# Patient Record
Sex: MALE | Race: WHITE | Employment: FULL TIME | URBAN - METROPOLITAN AREA
[De-identification: names, ages, dates, MRNs, and addresses within clinical notes are randomized per-mention and may not be internally consistent; named-entity substitution may affect disease eponyms.]

---

## 2017-07-24 ENCOUNTER — HOSPITAL ENCOUNTER (EMERGENCY)
Age: 47
Discharge: HOME | End: 2017-07-24
Attending: EMERGENCY MEDICINE
Payer: COMMERCIAL

## 2017-07-24 ENCOUNTER — APPOINTMENT (OUTPATIENT)
Dept: GENERAL RADIOLOGY | Age: 47
End: 2017-07-24
Attending: EMERGENCY MEDICINE
Payer: COMMERCIAL

## 2017-07-24 VITALS
TEMPERATURE: 97.5 F | BODY MASS INDEX: 20.52 KG/M2 | RESPIRATION RATE: 16 BRPM | HEART RATE: 113 BPM | WEIGHT: 143.3 LBS | OXYGEN SATURATION: 97 % | HEIGHT: 70 IN | DIASTOLIC BLOOD PRESSURE: 78 MMHG | SYSTOLIC BLOOD PRESSURE: 130 MMHG

## 2017-07-24 DIAGNOSIS — S22.41XA CLOSED FRACTURE OF MULTIPLE RIBS OF RIGHT SIDE, INITIAL ENCOUNTER: Primary | ICD-10-CM

## 2017-07-24 PROCEDURE — 99282 EMERGENCY DEPT VISIT SF MDM: CPT

## 2017-07-24 PROCEDURE — 74011250637 HC RX REV CODE- 250/637: Performed by: EMERGENCY MEDICINE

## 2017-07-24 PROCEDURE — 71101 X-RAY EXAM UNILAT RIBS/CHEST: CPT

## 2017-07-24 RX ORDER — HYDROCODONE BITARTRATE AND ACETAMINOPHEN 5; 325 MG/1; MG/1
1 TABLET ORAL
Status: COMPLETED | OUTPATIENT
Start: 2017-07-24 | End: 2017-07-24

## 2017-07-24 RX ORDER — HYDROCODONE BITARTRATE AND ACETAMINOPHEN 5; 325 MG/1; MG/1
1 TABLET ORAL
Qty: 20 TAB | Refills: 0 | Status: SHIPPED | OUTPATIENT
Start: 2017-07-24 | End: 2019-01-30

## 2017-07-24 RX ORDER — CLARITHROMYCIN 500 MG/1
500 TABLET, FILM COATED, EXTENDED RELEASE ORAL DAILY
COMMUNITY
End: 2019-01-30

## 2017-07-24 RX ORDER — HYDROCHLOROTHIAZIDE 25 MG/1
25 TABLET ORAL DAILY
COMMUNITY
End: 2019-01-30

## 2017-07-24 RX ADMIN — HYDROCODONE BITARTRATE AND ACETAMINOPHEN 1 TABLET: 5; 325 TABLET ORAL at 10:57

## 2017-07-24 NOTE — DISCHARGE INSTRUCTIONS
We hope that we have addressed all of your medical concerns. The examination and treatment you received in the Emergency Department were for an emergent problem and were not intended as complete care. It is important that you follow up with your healthcare provider(s) for ongoing care. If your symptoms worsen or do not improve as expected, and you are unable to reach your usual health care provider(s), you should return to the Emergency Department. Today's healthcare is undergoing tremendous change, and patient satisfaction surveys are one of the many tools to assess the quality of medical care. You may receive a survey from the [employer] regarding your experience in the Emergency Department. I hope that your experience has been completely positive, particularly the medical care that I provided. As such, please participate in the survey; anything less than excellent does not meet my expectations or intentions. Formerly [facility] and [address] [place] [address] participate in nationally recognized quality of care measures. If your blood pressure is greater than 120/80, as reported below, we urge that you seek medical care to address the potential of high blood pressure, commonly known as hypertension. Hypertension can be hereditary or can be caused by certain medical conditions, pain, stress, or \"white coat syndrome. \"       Please make an appointment with your health care provider(s) for follow up of your Emergency Department visit. VITALS:   Patient Vitals for the past 8 hrs:   Temp Pulse Resp BP SpO2   07/24/17 1005 97.5 °F (36.4 °C) (!) 135 18 179/89 94 %          Thank you for allowing us to provide you with medical care today. We realize that you have many choices for your emergency care needs. Please choose us in the future for any continued health care needs.       Robert Pate MD    [facility], [address] [facility]. Office: [phone]            No results found for this or any previous visit (from the past 24 hour(s)). Xr Ribs Rt W Pa Cxr Min 3 V    Result Date: 7/24/2017  EXAM:  XR RIBS RT W PA CXR MIN 3 V INDICATION:   fall, hit right ribs, eval for fracture COMPARISON: None. FINDINGS: A frontal radiograph of the chest and 3 oblique views of the right ribs demonstrate nondisplaced fractures of anterior right sixth and seventh ribs. . There is no pneumothorax or pleural effusion. IMPRESSION:  Nondisplaced fractures right sixth and seventh ribs            Broken Rib: Care Instructions  Your Care Instructions    A broken rib is a crack or break in one of the bones of the rib cage. Breathing can be very painful because the muscles used for breathing pull on the rib. In most cases, a broken rib will heal on its own. You can take pain medicine while the rib mends. Pain relief allows you to take deep breaths. In the past, doctors recommended taping or wrapping broken ribs. This is no longer done because taping makes it hard for you to take deep breaths. You need to take deep breaths at least once an hour to prevent pneumonia or a partial collapse of a lung. Your rib will heal in about 6 weeks. You heal best when you take good care of yourself. Eat a variety of healthy foods, and don't smoke. Follow-up care is a key part of your treatment and safety. Be sure to make and go to all appointments, and call your doctor if you are having problems. It's also a good idea to know your test results and keep a list of the medicines you take. How can you care for yourself at home? · Be safe with medicines. Read and follow all instructions on the label. ¨ If the doctor gave you a prescription medicine for pain, take it as prescribed. ¨ If you are not taking a prescription pain medicine, ask your doctor if you can take an over-the-counter medicine.   · Even if it hurts, cough or take the deepest breath you can at least once every hour. This will get air deeply into your lungs and reduce your chance of getting pneumonia or a partial collapse of a lung. Hold a pillow against your chest to make this less painful. · Put ice or a cold pack on the area for 10 to 20 minutes at a time. Put a thin cloth between the ice and your skin. When should you call for help? Call 911 anytime you think you may need emergency care. For example, call if:  · You have severe trouble breathing. Call your doctor now or seek immediate medical care if:  · You have some trouble breathing. · You have a fever. · You have a new or worse cough. Watch closely for changes in your health, and be sure to contact your doctor if:  · You have pain even after taking your medicine. · You do not get better as expected. Where can you learn more? Go to http://yohannes-tye.info/. Enter M135 in the search box to learn more about \"Broken Rib: Care Instructions. \"  Current as of: March 21, 2017  Content Version: 11.3  © 3228-4213 [employer]. Care instructions adapted under license by [employer] (which disclaims liability or warranty for this information). If you have questions about a medical condition or this instruction, always ask your healthcare professional. [address] any warranty or liability for your use of this information.

## 2017-07-24 NOTE — ED TRIAGE NOTES
Pt ambulates to treatment area he states that on Saturday he fell into the corner of the coffee table and injured his right lower ribs. He is having constant pain but it increases with certain movements and when he coughs.   He recently has had bronchitis and concerned about the ribs

## 2017-07-24 NOTE — LETTER
[facility] EMERGENCY DEPT 
[address] [facility] [address] [id number] [phone] Work/School Note Date: 7/24/2017 To Whom It May concern: 
 
Villa Go was seen and treated today in the emergency room by the following provider(s): 
Attending Provider: Wade Ramsey MD. Villa Go may return to work on 07/25/17. Sincerely, Sammie Pena RN

## 2017-07-24 NOTE — ED PROVIDER NOTES
HPI Comments: 68-year-old white male presents to the emergency department with right rib pain. Patient fell 2 days ago after he tripped over the dog. He hit his right lateral ribs on the corner of the coffee table. Patient has had pain in the right lateral ribs. He has taken ibuprofen with some relief. He also has used a heating pad on the right ribs. He denies any shortness of breath. No abdominal pain. No vomiting. He has been eating and drinking normally over the past 2 days. He describes moderate pain in the right lateral ribs that is worse with palpation. No radiation of the pain. The history is provided by the patient and the spouse. Past Medical History:   Diagnosis Date    Bronchitis     Hypertension        History reviewed. No pertinent surgical history. History reviewed. No pertinent family history. Social History     Social History    Marital status: SINGLE     Spouse name: N/A    Number of children: N/A    Years of education: N/A     Occupational History    Not on file. Social History Main Topics    Smoking status: Current Every Day Smoker     Packs/day: 2.00    Smokeless tobacco: Never Used    Alcohol use 25.2 oz/week     42 Cans of beer per week      Comment: daily    Drug use: No    Sexual activity: Not on file     Other Topics Concern    Not on file     Social History Narrative         ALLERGIES: Review of patient's allergies indicates no known allergies. Review of Systems   Constitutional: Negative for activity change and fever. HENT: Negative for congestion. Eyes: Negative for pain. Respiratory: Negative for cough and shortness of breath. Cardiovascular: Negative for leg swelling. Gastrointestinal: Negative for abdominal pain. Endocrine: Negative for polyuria. Genitourinary: Negative for flank pain and hematuria. Musculoskeletal: Negative for gait problem, neck pain and neck stiffness. Skin: Negative for color change. Allergic/Immunologic: Negative for immunocompromised state. Neurological: Negative for speech difficulty and headaches. Hematological: Does not bruise/bleed easily. Psychiatric/Behavioral: Negative for confusion. All other systems reviewed and are negative. Vitals:    07/24/17 1005   BP: 179/89   Pulse: (!) 135   Resp: 18   Temp: 97.5 °F (36.4 °C)   SpO2: 94%   Weight: 65 kg (143 lb 4.8 oz)   Height: 5' 10\" (1.778 m)            Physical Exam   Constitutional: He is oriented to person, place, and time. He appears well-developed and well-nourished. No distress. HENT:   Head: Normocephalic and atraumatic. Right Ear: External ear normal.   Left Ear: External ear normal.   Eyes: EOM are normal. Pupils are equal, round, and reactive to light. Neck: Normal range of motion. Neck supple. No JVD present. No tracheal deviation present. Cardiovascular: Regular rhythm and normal heart sounds. Exam reveals no gallop and no friction rub. No murmur heard. Hr is 120 and regular   Pulmonary/Chest: Effort normal and breath sounds normal. No stridor. No respiratory distress. He has no wheezes. He has no rales. He exhibits tenderness. Tender over right lateral ribs to palpation, no crepitus   Abdominal: Soft. Bowel sounds are normal. He exhibits no distension. There is no tenderness. There is no rebound and no guarding. Musculoskeletal: Normal range of motion. He exhibits no edema or tenderness. Neurological: He is alert and oriented to person, place, and time. He has normal reflexes. No cranial nerve deficit. He exhibits normal muscle tone. Coordination normal.   Skin: Skin is warm and dry. No rash noted. He is not diaphoretic. No erythema. Psychiatric: He has a normal mood and affect. His behavior is normal. Judgment and thought content normal.   Anxious appearing   Nursing note and vitals reviewed.        MDM  Number of Diagnoses or Management Options  Diagnosis management comments: Patient has pain in the right lateral ribs to palpation. Differential diagnosis includes fracture, contusion. We'll check x-rays. We'll give hydrocodone and apply ice. Patient's heart rate is elevated I think is some due to pain and some due to anxiety. Amount and/or Complexity of Data Reviewed  Tests in the radiology section of CPT®: ordered and reviewed  Decide to obtain previous medical records or to obtain history from someone other than the patient: yes  Obtain history from someone other than the patient: yes  Review and summarize past medical records: yes  Independent visualization of images, tracings, or specimens: yes    Risk of Complications, Morbidity, and/or Mortality  Presenting problems: moderate  Diagnostic procedures: moderate  Management options: moderate      ED Course       Procedures    Xrays show fractures nondisplaced over two ribs on right    Apply ice to affected area 20 min on and 20 min off    Norco prn for pain    Good return precautions given to patient. Close follow up with PCP recommended. Patient and/or family voices understanding of this plan. Discharge instructions were explained by me and all concerns were addressed.

## 2017-07-24 NOTE — ED NOTES
Pt given discharge instructions by Dr Luis Aguiar, he verbalizes an understanding pt stable at time of discharge

## 2019-01-30 ENCOUNTER — APPOINTMENT (OUTPATIENT)
Dept: CT IMAGING | Age: 49
DRG: 065 | End: 2019-01-30
Attending: EMERGENCY MEDICINE
Payer: COMMERCIAL

## 2019-01-30 ENCOUNTER — APPOINTMENT (OUTPATIENT)
Dept: MRI IMAGING | Age: 49
DRG: 065 | End: 2019-01-30
Attending: INTERNAL MEDICINE
Payer: COMMERCIAL

## 2019-01-30 ENCOUNTER — APPOINTMENT (OUTPATIENT)
Dept: NON INVASIVE DIAGNOSTICS | Age: 49
DRG: 065 | End: 2019-01-30
Attending: INTERNAL MEDICINE
Payer: COMMERCIAL

## 2019-01-30 ENCOUNTER — APPOINTMENT (OUTPATIENT)
Dept: VASCULAR SURGERY | Age: 49
DRG: 065 | End: 2019-01-30
Attending: INTERNAL MEDICINE
Payer: COMMERCIAL

## 2019-01-30 ENCOUNTER — HOSPITAL ENCOUNTER (INPATIENT)
Age: 49
LOS: 1 days | Discharge: HOME | DRG: 065 | End: 2019-01-31
Attending: EMERGENCY MEDICINE | Admitting: INTERNAL MEDICINE
Payer: COMMERCIAL

## 2019-01-30 DIAGNOSIS — I10 HYPERTENSION, UNSPECIFIED TYPE: ICD-10-CM

## 2019-01-30 DIAGNOSIS — F10.10 ALCOHOL ABUSE: ICD-10-CM

## 2019-01-30 DIAGNOSIS — I63.9 CEREBROVASCULAR ACCIDENT (CVA), UNSPECIFIED MECHANISM (HCC): Primary | ICD-10-CM

## 2019-01-30 PROBLEM — Z72.0 TOBACCO ABUSE: Status: ACTIVE | Noted: 2019-01-30

## 2019-01-30 LAB
ALBUMIN SERPL-MCNC: 3.5 G/DL (ref 3.5–5)
ALBUMIN/GLOB SERPL: 0.8 {RATIO} (ref 1.1–2.2)
ALP SERPL-CCNC: 144 U/L (ref 45–117)
ALT SERPL-CCNC: 19 U/L (ref 12–78)
ANION GAP SERPL CALC-SCNC: 12 MMOL/L (ref 5–15)
AST SERPL-CCNC: 20 U/L (ref 15–37)
BASOPHILS # BLD: 0.1 K/UL (ref 0–0.1)
BASOPHILS NFR BLD: 1 % (ref 0–1)
BILIRUB SERPL-MCNC: 0.6 MG/DL (ref 0.2–1)
BUN SERPL-MCNC: 16 MG/DL (ref 6–20)
BUN/CREAT SERPL: 16 (ref 12–20)
CALCIUM SERPL-MCNC: 9.5 MG/DL (ref 8.5–10.1)
CHLORIDE SERPL-SCNC: 102 MMOL/L (ref 97–108)
CO2 SERPL-SCNC: 25 MMOL/L (ref 21–32)
COMMENT, HOLDF: NORMAL
CREAT SERPL-MCNC: 1 MG/DL (ref 0.7–1.3)
DIFFERENTIAL METHOD BLD: ABNORMAL
ECHO AO ROOT DIAM: 3.91 CM
ECHO AV AREA PEAK VELOCITY: 1.9 CM2
ECHO AV PEAK GRADIENT: 8.4 MMHG
ECHO AV PEAK VELOCITY: 145.24 CM/S
ECHO EST RA PRESSURE: 3 MMHG
ECHO LA MAJOR AXIS: 2.42 CM
ECHO LA TO AORTIC ROOT RATIO: 0.62
ECHO LA VOL 2C: 27.39 ML (ref 18–58)
ECHO LA VOL 4C: 20.7 ML (ref 18–58)
ECHO LA VOL BP: 26.69 ML (ref 18–58)
ECHO LA VOL/BSA BIPLANE: 15.26 ML/M2 (ref 16–28)
ECHO LA VOLUME INDEX A2C: 15.66 ML/M2 (ref 16–28)
ECHO LA VOLUME INDEX A4C: 11.83 ML/M2 (ref 16–28)
ECHO LV INTERNAL DIMENSION DIASTOLIC: 4.24 CM (ref 4.2–5.9)
ECHO LV INTERNAL DIMENSION SYSTOLIC: 2.71 CM
ECHO LV IVSD: 0.84 CM (ref 0.6–1)
ECHO LV MASS 2D: 133.3 G (ref 88–224)
ECHO LV MASS INDEX 2D: 76.2 G/M2 (ref 49–115)
ECHO LV POSTERIOR WALL DIASTOLIC: 0.94 CM (ref 0.6–1)
ECHO LVOT DIAM: 2.07 CM
ECHO LVOT PEAK GRADIENT: 2.6 MMHG
ECHO LVOT PEAK VELOCITY: 81.23 CM/S
ECHO MV A VELOCITY: 56.54 CM/S
ECHO MV E DECELERATION TIME (DT): 183.4 MS
ECHO MV E VELOCITY: 0.47 CM/S
ECHO MV E/A RATIO: 0.01
ECHO MV REGURGITANT PEAK GRADIENT: 23.4 MMHG
ECHO MV REGURGITANT PEAK VELOCITY: 241.63 CM/S
ECHO PULMONARY ARTERY SYSTOLIC PRESSURE (PASP): 25.3 MMHG
ECHO PV MAX VELOCITY: 57.45 CM/S
ECHO PV PEAK GRADIENT: 1.3 MMHG
ECHO RIGHT VENTRICULAR SYSTOLIC PRESSURE (RVSP): 25.3 MMHG
ECHO RV INTERNAL DIMENSION: 2.17 CM
ECHO TV REGURGITANT MAX VELOCITY: 235.85 CM/S
ECHO TV REGURGITANT PEAK GRADIENT: 22.3 MMHG
EOSINOPHIL # BLD: 0.3 K/UL (ref 0–0.4)
EOSINOPHIL NFR BLD: 2 % (ref 0–7)
ERYTHROCYTE [DISTWIDTH] IN BLOOD BY AUTOMATED COUNT: 13.2 % (ref 11.5–14.5)
ETHANOL SERPL-MCNC: <10 MG/DL
GLOBULIN SER CALC-MCNC: 4.2 G/DL (ref 2–4)
GLUCOSE BLD STRIP.AUTO-MCNC: 127 MG/DL (ref 65–100)
GLUCOSE SERPL-MCNC: 106 MG/DL (ref 65–100)
HCT VFR BLD AUTO: 46.5 % (ref 36.6–50.3)
HGB BLD-MCNC: 15.6 G/DL (ref 12.1–17)
IMM GRANULOCYTES # BLD AUTO: 0.1 K/UL (ref 0–0.04)
IMM GRANULOCYTES NFR BLD AUTO: 0 % (ref 0–0.5)
INR BLD: 1 (ref 0.9–1.2)
LYMPHOCYTES # BLD: 1.8 K/UL (ref 0.8–3.5)
LYMPHOCYTES NFR BLD: 13 % (ref 12–49)
MCH RBC QN AUTO: 32.4 PG (ref 26–34)
MCHC RBC AUTO-ENTMCNC: 33.5 G/DL (ref 30–36.5)
MCV RBC AUTO: 96.7 FL (ref 80–99)
MONOCYTES # BLD: 1.1 K/UL (ref 0–1)
MONOCYTES NFR BLD: 8 % (ref 5–13)
NEUTS SEG # BLD: 10.7 K/UL (ref 1.8–8)
NEUTS SEG NFR BLD: 76 % (ref 32–75)
NRBC # BLD: 0 K/UL (ref 0–0.01)
NRBC BLD-RTO: 0 PER 100 WBC
PLATELET # BLD AUTO: 380 K/UL (ref 150–400)
PMV BLD AUTO: 9 FL (ref 8.9–12.9)
POTASSIUM SERPL-SCNC: 4 MMOL/L (ref 3.5–5.1)
PROT SERPL-MCNC: 7.7 G/DL (ref 6.4–8.2)
RBC # BLD AUTO: 4.81 M/UL (ref 4.1–5.7)
SAMPLES BEING HELD,HOLD: NORMAL
SERVICE CMNT-IMP: ABNORMAL
SODIUM SERPL-SCNC: 139 MMOL/L (ref 136–145)
WBC # BLD AUTO: 14 K/UL (ref 4.1–11.1)

## 2019-01-30 PROCEDURE — 85025 COMPLETE CBC W/AUTO DIFF WBC: CPT

## 2019-01-30 PROCEDURE — 36415 COLL VENOUS BLD VENIPUNCTURE: CPT

## 2019-01-30 PROCEDURE — 99285 EMERGENCY DEPT VISIT HI MDM: CPT

## 2019-01-30 PROCEDURE — 92523 SPEECH SOUND LANG COMPREHEN: CPT

## 2019-01-30 PROCEDURE — 93306 TTE W/DOPPLER COMPLETE: CPT

## 2019-01-30 PROCEDURE — 96374 THER/PROPH/DIAG INJ IV PUSH: CPT

## 2019-01-30 PROCEDURE — 74011250636 HC RX REV CODE- 250/636: Performed by: EMERGENCY MEDICINE

## 2019-01-30 PROCEDURE — 70496 CT ANGIOGRAPHY HEAD: CPT

## 2019-01-30 PROCEDURE — 80053 COMPREHEN METABOLIC PANEL: CPT

## 2019-01-30 PROCEDURE — 74011250637 HC RX REV CODE- 250/637: Performed by: EMERGENCY MEDICINE

## 2019-01-30 PROCEDURE — 93005 ELECTROCARDIOGRAM TRACING: CPT

## 2019-01-30 PROCEDURE — 80307 DRUG TEST PRSMV CHEM ANLYZR: CPT

## 2019-01-30 PROCEDURE — 93880 EXTRACRANIAL BILAT STUDY: CPT

## 2019-01-30 PROCEDURE — 82962 GLUCOSE BLOOD TEST: CPT

## 2019-01-30 PROCEDURE — 96361 HYDRATE IV INFUSION ADD-ON: CPT

## 2019-01-30 PROCEDURE — 70450 CT HEAD/BRAIN W/O DYE: CPT

## 2019-01-30 PROCEDURE — 74011636320 HC RX REV CODE- 636/320: Performed by: RADIOLOGY

## 2019-01-30 PROCEDURE — 65660000000 HC RM CCU STEPDOWN

## 2019-01-30 PROCEDURE — 70551 MRI BRAIN STEM W/O DYE: CPT

## 2019-01-30 PROCEDURE — 70544 MR ANGIOGRAPHY HEAD W/O DYE: CPT

## 2019-01-30 PROCEDURE — 85610 PROTHROMBIN TIME: CPT

## 2019-01-30 PROCEDURE — 74011250637 HC RX REV CODE- 250/637: Performed by: NURSE PRACTITIONER

## 2019-01-30 RX ORDER — FOLIC ACID 1 MG/1
1 TABLET ORAL
Status: COMPLETED | OUTPATIENT
Start: 2019-01-30 | End: 2019-01-30

## 2019-01-30 RX ORDER — ALBUTEROL SULFATE 90 UG/1
2 AEROSOL, METERED RESPIRATORY (INHALATION)
COMMUNITY

## 2019-01-30 RX ORDER — FOLIC ACID 1 MG/1
1 TABLET ORAL DAILY
Status: DISCONTINUED | OUTPATIENT
Start: 2019-01-31 | End: 2019-01-31 | Stop reason: HOSPADM

## 2019-01-30 RX ORDER — IBUPROFEN 200 MG
1 TABLET ORAL DAILY
Status: DISCONTINUED | OUTPATIENT
Start: 2019-01-30 | End: 2019-01-31 | Stop reason: HOSPADM

## 2019-01-30 RX ORDER — ASPIRIN 325 MG/1
100 TABLET, FILM COATED ORAL
Status: COMPLETED | OUTPATIENT
Start: 2019-01-30 | End: 2019-01-30

## 2019-01-30 RX ORDER — MULTIVITAMIN WITH IRON
1 TABLET ORAL
Status: COMPLETED | OUTPATIENT
Start: 2019-01-30 | End: 2019-01-30

## 2019-01-30 RX ORDER — ACETAMINOPHEN 650 MG/1
650 SUPPOSITORY RECTAL
Status: DISCONTINUED | OUTPATIENT
Start: 2019-01-30 | End: 2019-01-31 | Stop reason: HOSPADM

## 2019-01-30 RX ORDER — LORAZEPAM 2 MG/ML
2 INJECTION INTRAMUSCULAR
Status: DISCONTINUED | OUTPATIENT
Start: 2019-01-30 | End: 2019-01-31 | Stop reason: HOSPADM

## 2019-01-30 RX ORDER — IBUPROFEN 200 MG
200-400 TABLET ORAL
COMMUNITY
End: 2019-01-31

## 2019-01-30 RX ORDER — ONDANSETRON 2 MG/ML
4 INJECTION INTRAMUSCULAR; INTRAVENOUS
Status: COMPLETED | OUTPATIENT
Start: 2019-01-30 | End: 2019-01-30

## 2019-01-30 RX ORDER — ASPIRIN 325 MG
325 TABLET ORAL DAILY
Status: DISCONTINUED | OUTPATIENT
Start: 2019-01-31 | End: 2019-01-30

## 2019-01-30 RX ORDER — ENOXAPARIN SODIUM 100 MG/ML
40 INJECTION SUBCUTANEOUS DAILY
Status: DISCONTINUED | OUTPATIENT
Start: 2019-01-31 | End: 2019-01-31 | Stop reason: HOSPADM

## 2019-01-30 RX ORDER — SODIUM CHLORIDE 0.9 % (FLUSH) 0.9 %
5-40 SYRINGE (ML) INJECTION AS NEEDED
Status: DISCONTINUED | OUTPATIENT
Start: 2019-01-30 | End: 2019-01-31 | Stop reason: HOSPADM

## 2019-01-30 RX ORDER — LORAZEPAM 2 MG/ML
4 INJECTION INTRAMUSCULAR
Status: DISCONTINUED | OUTPATIENT
Start: 2019-01-30 | End: 2019-01-31 | Stop reason: HOSPADM

## 2019-01-30 RX ORDER — LISINOPRIL 20 MG/1
20 TABLET ORAL DAILY
COMMUNITY
End: 2019-01-31

## 2019-01-30 RX ORDER — GUAIFENESIN 100 MG/5ML
324 LIQUID (ML) ORAL
Status: COMPLETED | OUTPATIENT
Start: 2019-01-30 | End: 2019-01-30

## 2019-01-30 RX ORDER — LORAZEPAM 2 MG/ML
2 INJECTION INTRAMUSCULAR
Status: ACTIVE | OUTPATIENT
Start: 2019-01-30 | End: 2019-01-30

## 2019-01-30 RX ORDER — ACETAMINOPHEN 325 MG/1
650 TABLET ORAL
Status: DISCONTINUED | OUTPATIENT
Start: 2019-01-30 | End: 2019-01-31 | Stop reason: HOSPADM

## 2019-01-30 RX ORDER — ASPIRIN 325 MG/1
100 TABLET, FILM COATED ORAL DAILY
Status: DISCONTINUED | OUTPATIENT
Start: 2019-01-31 | End: 2019-01-31 | Stop reason: HOSPADM

## 2019-01-30 RX ORDER — GUAIFENESIN 100 MG/5ML
81 LIQUID (ML) ORAL DAILY
Status: DISCONTINUED | OUTPATIENT
Start: 2019-01-31 | End: 2019-01-30

## 2019-01-30 RX ORDER — GUAIFENESIN 100 MG/5ML
81 LIQUID (ML) ORAL DAILY
Status: DISCONTINUED | OUTPATIENT
Start: 2019-01-31 | End: 2019-01-31 | Stop reason: HOSPADM

## 2019-01-30 RX ORDER — IBUPROFEN 200 MG
1 TABLET ORAL DAILY
Status: DISCONTINUED | OUTPATIENT
Start: 2019-01-31 | End: 2019-01-30

## 2019-01-30 RX ORDER — SODIUM CHLORIDE 0.9 % (FLUSH) 0.9 %
5-40 SYRINGE (ML) INJECTION EVERY 8 HOURS
Status: DISCONTINUED | OUTPATIENT
Start: 2019-01-30 | End: 2019-01-31 | Stop reason: HOSPADM

## 2019-01-30 RX ADMIN — SODIUM CHLORIDE 1000 ML: 900 INJECTION, SOLUTION INTRAVENOUS at 10:54

## 2019-01-30 RX ADMIN — Medication 10 ML: at 21:01

## 2019-01-30 RX ADMIN — IOPAMIDOL 100 ML: 755 INJECTION, SOLUTION INTRAVENOUS at 10:42

## 2019-01-30 RX ADMIN — Medication 100 MG: at 12:04

## 2019-01-30 RX ADMIN — Medication 10 ML: at 15:29

## 2019-01-30 RX ADMIN — ASPIRIN 81 MG 324 MG: 81 TABLET ORAL at 10:51

## 2019-01-30 RX ADMIN — FOLIC ACID 1 MG: 1 TABLET ORAL at 12:04

## 2019-01-30 RX ADMIN — Medication 1 TABLET: at 12:04

## 2019-01-30 RX ADMIN — ONDANSETRON 4 MG: 2 INJECTION INTRAMUSCULAR; INTRAVENOUS at 10:52

## 2019-01-30 NOTE — ED PROVIDER NOTES
51y/o M BIBYOLANDA who is a difficult historian. Pt reports that he has been vomiting and unable to tolerate PO for the last 3-4 days. Reports that he generally uses alcohol every day, but last time he had any alcohol at this point was 2d ago. States that he woke this AM with feeling confused. Currently, noted to have difficulty stringing words together coherently at times, but is oriented to self, place, time. No motor deficits noted, report by EMS that he was having difficulty using the L arm is not evident currently. Past Medical History:  
Diagnosis Date  Bronchitis  Hypertension No past surgical history on file. No family history on file. Social History Socioeconomic History  Marital status: SINGLE Spouse name: Not on file  Number of children: Not on file  Years of education: Not on file  Highest education level: Not on file Social Needs  Financial resource strain: Not on file  Food insecurity - worry: Not on file  Food insecurity - inability: Not on file  Transportation needs - medical: Not on file  Transportation needs - non-medical: Not on file Occupational History  Not on file Tobacco Use  Smoking status: Current Every Day Smoker Packs/day: 2.00  Smokeless tobacco: Never Used Substance and Sexual Activity  Alcohol use: Yes Alcohol/week: 25.2 oz Types: 42 Cans of beer per week Comment: daily  Drug use: No  
 Sexual activity: Not on file Other Topics Concern  Not on file Social History Narrative  Not on file ALLERGIES: Patient has no known allergies. Review of Systems Reason unable to perform ROS: limited due to difficult historian. Constitutional: Negative for fever. Respiratory: Negative. Gastrointestinal: Positive for nausea and vomiting. All other systems reviewed and are negative. Vitals:  
 01/30/19 4149 Pulse: (!) 113 Resp: 16 Temp: 97.4 °F (36.3 °C) SpO2: 91% Weight: 65 kg (143 lb 4.8 oz) Height: 5' 11\" (1.803 m) Physical Exam  
Constitutional: He is oriented to person, place, and time. He appears well-developed and well-nourished. HENT:  
Head: Normocephalic and atraumatic. Eyes: EOM are normal. Pupils are equal, round, and reactive to light. Neck: Normal range of motion. Neck supple. Cardiovascular: Normal rate. Pulmonary/Chest: Effort normal and breath sounds normal.  
Abdominal: Soft. Bowel sounds are normal. He exhibits no distension. There is no tenderness. Musculoskeletal: Normal range of motion. He exhibits no edema. Neurological: He is alert and oriented to person, place, and time. He has normal strength. No cranial nerve deficit. Pt with noted difficulty stringing words together in a series. Able to answer direct questions and orientation questions without difficulty and with clear speech. Skin: Skin is warm and dry. Vitals reviewed. MDM Number of Diagnoses or Management Options Diagnosis management comments: 53y/o M with h/o alcohol abuse, recent n/v and inability to tolerate PO, with reported L arm weakness which is not present currently and speech difficulty. Last known well per family reported to EMS as 9pm last night. Pt also reports waking up feeling \"confused\" this morning. Will need to r/o CVA, but pt not tPA candidate given onset last PM, and with specific sxs he has currently would not suspect endovascular intervention would be on offer either. Also need to r/o ICH, AKA, EtOH withdrawal (unlikely given current VS), alcoholic encephalopathy as causative/contributing factors. Will need admission. 1007 Subacute area of infarct L parietal lobe on CT read 1044 D/w Teleneuro - advised pt not candidate for tPA, or even really thrombectomy. Advises get CTAs, and admit for further workup. 1133 CTAs negative for large occlusion or other acute process. Discussed with Dr. Shaneka Post who will admit. Amount and/or Complexity of Data Reviewed Clinical lab tests: reviewed and ordered Tests in the radiology section of CPT®: reviewed and ordered Procedures Cta Code Neuro Head And Neck W Cont Result Date: 1/30/2019 IMPRESSION: 1. No intraluminal filling defect, occlusion or acute arterial abnormality demonstrated. 2. Age-indeterminate right posterior superior temporal lobe cortex infarct. Ct Code Neuro Head Wo Contrast 
 
Result Date: 1/30/2019 IMPRESSION: Small area of acute/subacute ischemia in the right posterior parietal lobe without hemorrhage. The findings were called to Dr. Nicole Auguste on 1/30/2019 at 10:03 AM by myself. [address] 128 Recent Results (from the past 12 hour(s)) GLUCOSE, POC Collection Time: 01/30/19 10:03 AM  
Result Value Ref Range Glucose (POC) 127 (H) 65 - 100 mg/dL Performed by Shanique Postal POC INR Collection Time: 01/30/19 10:08 AM  
Result Value Ref Range INR (POC) 1.0 <1.2 SAMPLES BEING HELD Collection Time: 01/30/19 10:44 AM  
Result Value Ref Range SAMPLES BEING HELD SST,MARYANN,GRN   
 COMMENT Add-on orders for these samples will be processed based on acceptable specimen integrity and analyte stability, which may vary by analyte. CBC WITH AUTOMATED DIFF Collection Time: 01/30/19 10:44 AM  
Result Value Ref Range WBC 14.0 (H) 4.1 - 11.1 K/uL  
 RBC 4.81 4.10 - 5.70 M/uL  
 HGB 15.6 12.1 - 17.0 g/dL HCT 46.5 36.6 - 50.3 % MCV 96.7 80.0 - 99.0 FL  
 MCH 32.4 26.0 - 34.0 PG  
 MCHC 33.5 30.0 - 36.5 g/dL  
 RDW 13.2 11.5 - 14.5 % PLATELET 305 002 - 697 K/uL MPV 9.0 8.9 - 12.9 FL  
 NRBC 0.0 0  WBC ABSOLUTE NRBC 0.00 0.00 - 0.01 K/uL NEUTROPHILS 76 (H) 32 - 75 % LYMPHOCYTES 13 12 - 49 % MONOCYTES 8 5 - 13 % EOSINOPHILS 2 0 - 7 % BASOPHILS 1 0 - 1 % IMMATURE GRANULOCYTES 0 0.0 - 0.5 % ABS. NEUTROPHILS 10.7 (H) 1.8 - 8.0 K/UL  
 ABS. LYMPHOCYTES 1.8 0.8 - 3.5 K/UL ABS. MONOCYTES 1.1 (H) 0.0 - 1.0 K/UL  
 ABS. EOSINOPHILS 0.3 0.0 - 0.4 K/UL  
 ABS. BASOPHILS 0.1 0.0 - 0.1 K/UL  
 ABS. IMM. GRANS. 0.1 (H) 0.00 - 0.04 K/UL  
 DF AUTOMATED METABOLIC PANEL, COMPREHENSIVE Collection Time: 01/30/19 10:44 AM  
Result Value Ref Range Sodium 139 136 - 145 mmol/L Potassium 4.0 3.5 - 5.1 mmol/L Chloride 102 97 - 108 mmol/L  
 CO2 25 21 - 32 mmol/L Anion gap 12 5 - 15 mmol/L Glucose 106 (H) 65 - 100 mg/dL BUN 16 6 - 20 MG/DL Creatinine 1.00 0.70 - 1.30 MG/DL  
 BUN/Creatinine ratio 16 12 - 20 GFR est AA >60 >60 ml/min/1.73m2 GFR est non-AA >60 >60 ml/min/1.73m2 Calcium 9.5 8.5 - 10.1 MG/DL Bilirubin, total 0.6 0.2 - 1.0 MG/DL  
 ALT (SGPT) 19 12 - 78 U/L  
 AST (SGOT) 20 15 - 37 U/L Alk. phosphatase 144 (H) 45 - 117 U/L Protein, total 7.7 6.4 - 8.2 g/dL Albumin 3.5 3.5 - 5.0 g/dL Globulin 4.2 (H) 2.0 - 4.0 g/dL A-G Ratio 0.8 (L) 1.1 - 2.2 ETHYL ALCOHOL Collection Time: 01/30/19 10:44 AM  
Result Value Ref Range  ALCOHOL(ETHYL),SERUM <10 <10 MG/DL

## 2019-01-30 NOTE — PROGRESS NOTES
Spiritual Care Assessment/Progress Note [address] 
 
 
NAME: Rudolph Cerrato      MRN: [id number] AGE: 46 y.o. SEX: male Yazidi Affiliation: [facility]  
Language: [place] 1/30/2019     Total Time (in minutes): 11 Spiritual Assessment begun in [facility] EMERGENCY DEPT through conversation with: 
  
    []Patient        [] Family    [] Friend(s) Reason for Consult: Crisis Spiritual beliefs: (Please include comment if needed) 
   [] Identifies with a malcom tradition:     
   [] Supported by a malcom community:        
   [] Claims no spiritual orientation:       
   [] Seeking spiritual identity:            
   [] Adheres to an individual form of spirituality:       
   [x] Not able to assess:                   
 
    
Identified resources for coping:  
   [] Prayer                           
   [] Music                  [] Guided Imagery 
   [] Family/friends                 [] Pet visits [] Devotional reading                         [] Unknown 
   [] Other:                                         
 
 
Interventions offered during this visit: (See comments for more details) Patient Interventions: Initial/Spiritual assessment, Critical care Plan of Care: 
 
 [] Support spiritual and/or cultural needs  
 [] Support AMD and/or advance care planning process    
 [] Support grieving process 
 [] Coordinate Rites and/or Rituals  
 [] Coordination with community clergy 
 [x] No spiritual needs identified at this time 
 [] Detailed Plan of Care below (See Comments)  [] Make referral to Music Therapy 
[] Make referral to Pet Therapy    
[] Make referral to Addiction services 
[] Make referral to [facility] 
[] Make referral to Spiritual Care Partner 
[] No future visits requested       
[] Follow up visits as needed Comments: Responded to code blue in the ED. Staff was caring for Mr. Casimiro Black and he is now gone for medical procedure.  His family has not arrived but EMS said they should be here shortly. Asked staff to page pastoral care as needed. Bonita Rojas., [facility], [place] [address] [facility]

## 2019-01-30 NOTE — CONSULTS
[facility]: [id number] [address] Neurology  [place] [address]  [phone]        Name:   Lacie Both record #: [id number]  Admission Date: 1/30/2019   Who Consulted: Dr. William Scales  Reason for Consult: Slurred Speech    HISTORY OF PRESENT ILLNESS   This is a 46 y.o. male with  has a past medical history of Bronchitis and Hypertension. who is admitted for expressive aphasia, RT arm weakness. The Neurology Service is asked to evaluate for stroke. Mr. Alek Fitzgerald presented to the ED c/o difficulty talking, RT arm weakness, which started this overnight. He says that he was fine before he went to bed but when he tried to speak to his cousin this morning he found that he could not get his words out. His cousin noticed that he had facial droop and was not moving his right arm. Later, he went to the kitchen and started dropping things, which he tried to hold on his RT hand. He also fell. Denies leg weakness or vision problem. He currently smokes 2 packs of cigarettes per day. Presenting NIH stroke score:  1    Clinical Data  Imaging review:     CTH:  Age-indeterminate right posterior superior temporal lobe cortex infarct    Current rhythm:  EKG pending    Assessment/Plan:   1. Acute Ischemic Stroke:    · ASA 81 mg  · Will need ASA at discharge  · Neurochecks:  Every 1 hour  · Blood Sugar Goal:  140-180  · BP Goal: Less than 180/105 for 24 hours  · Telemetry for at least 24 hours  · Current rhythm: EKG pending    Stroke work up pending  · Last A1C:  · LDL:      · Last TTE:    · Follow up MRI/CT:  · Carotid vascular imaging:    Risk factors for stroke include:  HTN,  Tobacco use, Excessive ETOH, physical inactivity  · Discussed with patient:    · Discussed signs/symptoms of stroke and when to call 911  · Smoking cessation education i    3. Mobility:   · Has been OOB. · PT/OT to eval for rehab    4. Diet:    · Does not need SLP, passed STAND     5.   VTE Prophylaxes:   · Per primary team    Thank you for allowing the Neurology Service the pleasure of participating in the care of your patient. This patient will be discussed with my collaborating care team physician Dr. Bhavin Baires and she may have further recommendations regarding this patient's care. Attending Attestation:   ==============================================================    Attending Addendum    I have reviewed the documentation provided by the nurse practitioner, Derik Saldivar, discussed her findings, clinical impression, and the proposed management plans with regards to this patient's encounter. I have personally evaluated the patient, verify the history and confirm physical findings. Below are my additional findings:    HPI  This is a 45 y/o gentleman admitted with expressive aphasia and right side weakness. He was brought to ED and had a CT head that already showed a L MCA infarct. Given that it was suspected this was a wakeup stroke he was not treated with TPA. Subsequent CTA was done that was neg for thrombus. He was admitted for stroke w/u. CLINICAL DATA REVIEW  IMAGING: MRI brain: left MCA infarct (I personally reviewed these images in PACS and this is my impression)  CTA neg  TTE: normal EF with +PFO      PHYSICAL EXAM (additional findings)  Patient Vitals for the past 8 hrs:   Temp Pulse Resp BP SpO2   19 1128 97.7 °F (36.5 °C) 96 20 122/80    19 0712 99.8 °F (37.7 °C) (!) 103 16 118/80 96 %   19 0701  92       PHYSICAL EXAM     Visit Vitals  /86   Pulse 85   Temp 98.5 °F (36.9 °C)   Resp 11   Ht 5' 10\" (1.778 m)   Wt 59.9 kg (132 lb)   SpO2 93%   BMI 18.94 kg/m²      O2 Device: Room air    Temp (24hrs), Av.8 °F (36.6 °C), Min:97.4 °F (36.3 °C), Max:98.5 °F (36.9 °C)    No intake/output data recorded. No intake/output data recorded. General:  Alert, cooperative, no acute distress. Lungs:   Clear to auscultation bilaterally. No crackles/wheezes. Heart:  Abdominal:  Regular rate and rhythm, No murmur, click, rub or gallop. Soft and nondistended   Skin: Skin color, texture, turgor normal.    NEUROLOGICAL EXAM    Appearance:  Well developed, well nourished,  and is in no acute distress. Mental Status: Oriented to time, place and person. Fully attentive. No aphasia. Full fund of knowledge. Normal recent and remote memory. Cranial Nerves:   Intact visual fields. PERRL, Right eye does not turn inward, no nystagmus, no ptosis. Facial sensation is normal. Facial movement is symmetric but has dec NLF on right. Palate is midline. Normal sternocleidomastoid strength. Tongue is midline. Speech is slightly slurred but family states this is baseline. Reflexes:   Deep tendon reflexes 2+/4 and symmetrical.   Sensory:   Normal to temperature and vibration. Gait:  Normal gait. Tremor:   No tremor noted. Cerebellar:  No cerebellar signs present. Neurovascular:  Normal heart sounds and regular rhythm. No carotid bruits. Motor: No pronator drift of either outstretched arm. Deltoid Biceps Triceps Wrist Extension Finger Abduction   L 5 5 5 5 5   R 5 5 5 5 5      Hip Flexion Hip Extension Knee Flexion Knee Extension Ankle Dorsiflexion Ankle Plantarflexion   L 5 5 5 5 5 5   R 5 5 5 5 5 5        Reflexes:     Biceps Triceps Plantar Patellar Achilles   L 2 2 2 2 2   R 2 2 2 2 2          ADDITIONAL ASSESSMENT AND RECOMMENDATIONS:  This is a 47 y/o gentleman who presented with L MCA stroke. He has stroke risk factors of HTN and tobacco abuse. He was not taking his BP meds. He doesn't regularly follow with a doctor. Workup revealed a L MCA infarct. He also has a PFO. Cardiology would like patient to be on ASA and Plavix for this. 1. MRI brain with L MCA, CTA neg  2. TTE with PFO. Cardiology doesn't feel closure recommended at this time unless patient fails medical management of DAP therapy  3. Cont ASA and Plavix  4.  Start low dose statin for LDL goal of 70  5. PT/OT/ST  6. Stroke education provided    No further neuro w/u recommended but patient should f/u in neuro clinic in 2-3 weeks. Please call with further questions. Michael Mendoza MD  January 31, 2019     Review of Systems: 10 point ROS was performed. Pertinent positives listed in HPI. Negative ROS is as follows. Pt denies: angina, palpitations, paresthesias, weakness, vision loss, slurred speech, aphasia, confusion, fever, chills, falls, headache, diplopia, back pain, neck pain, prior episodes of vertigo, hallucinations, new medications or dosage changes. History  Past Medical History:   Diagnosis Date    Bronchitis     Hypertension      History reviewed. No pertinent surgical history. Family History   Problem Relation Age of Onset    COPD Mother    [facility] Cancer Father     COPD Father      Social History     Socioeconomic History    Marital status: SINGLE     Spouse name: Not on file    Number of children: Not on file    Years of education: Not on file    Highest education level: Not on file   Social Needs    Financial resource strain: Not on file    Food insecurity - worry: Not on file    Food insecurity - inability: Not on file   [employer] needs - medical: Not on file   [place] needs - non-medical: Not on file   Occupational History    Not on file   Tobacco Use    Smoking status: Current Every Day Smoker     Packs/day: 2.00    Smokeless tobacco: Never Used   Substance and Sexual Activity    Alcohol use: Yes     Alcohol/week: 25.2 oz     Types: 42 Cans of beer per week     Comment: daily    Drug use: No    Sexual activity: Not on file   Other Topics Concern    Not on file   Social History Narrative    Not on file       Allergies   No Known Allergies    Outpatient Meds  No current facility-administered medications on file prior to encounter.       Current Outpatient Medications on File Prior to Encounter   Medication Sig Dispense Refill    lisinopril (PRINIVIL, ZESTRIL) 20 mg tablet Take 20 mg by mouth daily.  albuterol (VENTOLIN HFA) 90 mcg/actuation inhaler Take 2 Puffs by inhalation every four (4) hours as needed for Wheezing or Shortness of Breath.  ibuprofen (MOTRIN) 200 mg tablet Take 200-400 mg by mouth every six (6) hours as needed for Pain.          Inpatient Meds    Current Facility-Administered Medications:     sodium chloride (NS) flush 5-40 mL, 5-40 mL, IntraVENous, Q8H, Shad Ashley MD    sodium chloride (NS) flush 5-40 mL, 5-40 mL, IntraVENous, PRN, Shad Tate MD    acetaminophen (TYLENOL) tablet 650 mg, 650 mg, Oral, Q4H PRN **OR** acetaminophen (TYLENOL) solution 650 mg, 650 mg, Per NG tube, Q4H PRN **OR** acetaminophen (TYLENOL) suppository 650 mg, 650 mg, Rectal, Q4H PRN, Shad Tate MD    [START ON 1/31/2019] aspirin tablet 325 mg, 325 mg, Oral, DAILY, Shad Ashley MD    [START ON 1/31/2019] enoxaparin (LOVENOX) injection 40 mg, 40 mg, SubCUTAneous, DAILY, Shad Ashley MD    [START ON 1/31/2019] nicotine (NICODERM CQ) 14 mg/24 hr patch 1 Patch, 1 Patch, TransDERmal, DAILY, Shad Ashley MD    [START ON 2/71/2192] folic acid (FOLVITE) tablet 1 mg, 1 mg, Oral, DAILY, Shad Ahsley MD    [START ON 1/31/2019] thiamine mononitrate (B-1) tablet 100 mg, 100 mg, Oral, DAILY, Shad Ashley MD    LORazepam (ATIVAN) injection 2 mg, 2 mg, IntraVENous, Q1H PRN, Shad Ashley MD    LORazepam (ATIVAN) injection 4 mg, 4 mg, IntraVENous, Q1H PRN, Guadalupe Hartman MD    Recent Results (from the past 24 hour(s))   GLUCOSE, POC    Collection Time: 01/30/19 10:03 AM   Result Value Ref Range    Glucose (POC) 127 (H) 65 - 100 mg/dL    Performed by Evelyne Moreau    POC INR    Collection Time: 01/30/19 10:08 AM   Result Value Ref Range    INR (POC) 1.0 <1.2     SAMPLES BEING HELD    Collection Time: 01/30/19 10:44 AM   Result Value Ref Range    SAMPLES BEING HELD SST,MARYANN,GRN     COMMENT        Add-on orders for these samples will be processed based on acceptable specimen integrity and analyte stability, which may vary by analyte. CBC WITH AUTOMATED DIFF    Collection Time: 01/30/19 10:44 AM   Result Value Ref Range    WBC 14.0 (H) 4.1 - 11.1 K/uL    RBC 4.81 4.10 - 5.70 M/uL    HGB 15.6 12.1 - 17.0 g/dL    HCT 46.5 36.6 - 50.3 %    MCV 96.7 80.0 - 99.0 FL    MCH 32.4 26.0 - 34.0 PG    MCHC 33.5 30.0 - 36.5 g/dL    RDW 13.2 11.5 - 14.5 %    PLATELET 897 511 - 114 K/uL    MPV 9.0 8.9 - 12.9 FL    NRBC 0.0 0  WBC    ABSOLUTE NRBC 0.00 0.00 - 0.01 K/uL    NEUTROPHILS 76 (H) 32 - 75 %    LYMPHOCYTES 13 12 - 49 %    MONOCYTES 8 5 - 13 %    EOSINOPHILS 2 0 - 7 %    BASOPHILS 1 0 - 1 %    IMMATURE GRANULOCYTES 0 0.0 - 0.5 %    ABS. NEUTROPHILS 10.7 (H) 1.8 - 8.0 K/UL    ABS. LYMPHOCYTES 1.8 0.8 - 3.5 K/UL    ABS. MONOCYTES 1.1 (H) 0.0 - 1.0 K/UL    ABS. EOSINOPHILS 0.3 0.0 - 0.4 K/UL    ABS. BASOPHILS 0.1 0.0 - 0.1 K/UL    ABS. IMM. GRANS. 0.1 (H) 0.00 - 0.04 K/UL    DF AUTOMATED     METABOLIC PANEL, COMPREHENSIVE    Collection Time: 01/30/19 10:44 AM   Result Value Ref Range    Sodium 139 136 - 145 mmol/L    Potassium 4.0 3.5 - 5.1 mmol/L    Chloride 102 97 - 108 mmol/L    CO2 25 21 - 32 mmol/L    Anion gap 12 5 - 15 mmol/L    Glucose 106 (H) 65 - 100 mg/dL    BUN 16 6 - 20 MG/DL    Creatinine 1.00 0.70 - 1.30 MG/DL    BUN/Creatinine ratio 16 12 - 20      GFR est AA >60 >60 ml/min/1.73m2    GFR est non-AA >60 >60 ml/min/1.73m2    Calcium 9.5 8.5 - 10.1 MG/DL    Bilirubin, total 0.6 0.2 - 1.0 MG/DL    ALT (SGPT) 19 12 - 78 U/L    AST (SGOT) 20 15 - 37 U/L    Alk.  phosphatase 144 (H) 45 - 117 U/L    Protein, total 7.7 6.4 - 8.2 g/dL    Albumin 3.5 3.5 - 5.0 g/dL    Globulin 4.2 (H) 2.0 - 4.0 g/dL    A-G Ratio 0.8 (L) 1.1 - 2.2     ETHYL ALCOHOL    Collection Time: 01/30/19 10:44 AM   Result Value Ref Range    ALCOHOL(ETHYL),SERUM <10 <10 MG/DL   DUPLEX CAROTID BILATERAL    Collection Time: 01/30/19  2:55 PM   Result Value Ref Range    Right vertebral sys 29.6 cm/s    RIGHT VERTEBRAL ARTERY D 10.22 cm/s    Right subclavian sys 98.4 cm/s    RIGHT SUBCLAVIAN ARTERY D 11.63 cm/s    Right cca dist sys 62.8 cm/s    Right CCA dist campbell 15.6 cm/s    Right CCA prox sys 88.4 cm/s    Right CCA prox campbell 21.5 cm/s    Right eca sys 102.2 cm/s    RIGHT EXTERNAL CAROTID ARTERY D 15.55 cm/s    Right ICA dist sys 54.2 cm/s    Right ICA dist campbell 21.9 cm/s    Right ICA mid sys 67.2 cm/s    Right ICA mid campbell 16.7 cm/s    Right ICA prox sys 56.8 cm/s    Right ICA prox campbell 21.9 cm/s    Right ICA/CCA sys 1.1     Left vertebral sys 32.9 cm/s    LEFT VERTEBRAL ARTERY D 13.08 cm/s    Left subclavian sys 132.2 cm/s    LEFT SUBCLAVIAN ARTERY D 16.03 cm/s    Left CCA dist sys 61.2 cm/s    Left CCA dist campbell 19.2 cm/s    Left CCA prox sys 90.4 cm/s    Left CCA prox campbell 25.3 cm/s    Left ECA sys 80.6 cm/s    LEFT EXTERNAL CAROTID ARTERY D 12.76 cm/s    Left ICA dist sys 63.6 cm/s    Left ICA dist campbell 25.2 cm/s    Left ICA mid sys 64.7 cm/s    Left ICA mid campbell 24.1 cm/s    Left ICA prox sys 49.4 cm/s    Left ICA prox campbell 16.4 cm/s    Left ICA/CCA sys 1.06        NIH Stroke Score  Total: 1 (01/30/19 0943)           Care Plan discussed with:  Patient x   Family    RN    Care Manager    Consultant/Specialist:       Edna Sheldon, JJ-BC

## 2019-01-30 NOTE — PROGRESS NOTES
9:36 Code S called, pt was 8 mins out 9:46 Pt arrives & no order ED called 9:49 order arrived Nurse stated pt was Irean Poser + 
9:50 called ED to see if CTA was need CTA not needed 9:52 exam was closed out Images were crossing over very slow 9:57 images were finally over, verified, & radiologist was notified

## 2019-01-30 NOTE — ED NOTES
Informed Dr Rosa Fang that pt has not had any alcohol since Monday when he became sick with N/V and body aches. He has been in bed feeling very weak since Monday when he became sick.

## 2019-01-30 NOTE — PROGRESS NOTES
Bedside and Verbal shift change report given to Aliza Campbell (oncoming nurse) by Prasanna Gamble (offgoing nurse). Report included the following information SBAR, Kardex, Intake/Output, Accordion and Recent Results.

## 2019-01-30 NOTE — H&P
[address] 
[phone] Admission History and Physical 
 
 
NAME:  Carmen Ortiz :   1970 MRN:  [id number] PCP:  None  
 
Date/Time:  2019 Subjective: CHIEF COMPLAINT: expressive aphasia, RT arm weakness HISTORY OF PRESENT ILLNESS:    
Mr. Paola Oconnor is a 46 y.o.  male who is admitted with acute CVA. Mr. Paola Oconnor with PMH of HTN, alcohol and tobacco abuse presented to ER c/o difficulty talking, RT arm weakness, which started this morning. Early this morning, pt woke up and had difficulty talking and facial droop was noticed by his cousin. Later, he went to the kitchen and started dropping things, which he tried to hold on his RT hand. He also fell. Denies leg weakness or vision problem. Has Hx of alcohol abuse, but for the last 3 days, he didn't drink due to not feeling well. Past Medical History:  
Diagnosis Date  Bronchitis  Hypertension History reviewed. No pertinent surgical history. Social History Tobacco Use  Smoking status: Current Every Day Smoker Packs/day: 2.00  Smokeless tobacco: Never Used Substance Use Topics  Alcohol use: Yes Alcohol/week: 25.2 oz Types: 42 Cans of beer per week Comment: daily Family History Problem Relation Age of Onset  COPD Mother  Cancer Father  COPD Father No Known Allergies Prior to Admission medications Medication Sig Start Date End Date Taking? Authorizing Provider  
hydroCHLOROthiazide (HYDRODIURIL) 25 mg tablet Take 25 mg by mouth daily. Dony Spain MD  
clarithromycin XL (BIAXIN XL) 500 mg SR tablet Take 500 mg by mouth daily. Dony Spain MD  
HYDROcodone-acetaminophen (NORCO) 5-325 mg per tablet Take 1 Tab by mouth every four (4) hours as needed for Pain. Max Daily Amount: 6 Tabs. 17   Av Metz MD  
 
 
 
Review of Systems: (bold if positive, if negative) Gen:  Eyes:  ENT:  CVS:  Pulm:  GI:   
:   
MS:  Skin:  Psych:  Endo:   
Hem:  Renal:   
Neuro:  weakness expressive aphasia Objective: VITALS:   
Vital signs reviewed; most recent are: 
 
Visit Vitals /78 Pulse 86 Temp 97.4 °F (36.3 °C) Resp 13 Ht 5' 10\" (1.778 m) Wt 60.1 kg (132 lb 7.9 oz) SpO2 94% BMI 19.01 kg/m² SpO2 Readings from Last 6 Encounters:  
01/30/19 94% 07/24/17 97% 07/03/15 95% No intake or output data in the 24 hours ending 01/30/19 1200 Exam:  
 
Physical Exam: 
 
Gen:  Well-developed, well-nourished, in no acute distress HEENT:  Pink conjunctivae, PERRL, hearing intact to voice, moist mucous membranes Neck:  Supple, without masses, thyroid non-tender Resp:  No accessory muscle use, clear breath sounds without wheezes rales or rhonchi 
Card:  No murmurs, normal S1, S2 without thrills, bruits or peripheral edema Abd:  Soft, non-tender, non-distended, normoactive bowel sounds are present, no palpable organomegaly and no detectable hernias Lymph:  No cervical or inguinal adenopathy Musc:  No cyanosis or clubbing Skin:  No rashes or ulcers, skin turgor is good Neuro:  Cranial nerves are grossly intact, no focal motor weakness, follows commands appropriately Psych:  Good insight, oriented to person, place and time, alert Labs: 
 
Recent Labs  
  01/30/19 
1044 WBC 14.0* HGB 15.6 HCT 46.5  Recent Labs  
  01/30/19 
1044   
K 4.0  
 CO2 25 * BUN 16  
CREA 1.00  
CA 9.5 ALB 3.5 TBILI 0.6 SGOT 20 ALT 19 No results found for: Sharri Hernandez No results for input(s): PH, PCO2, PO2, HCO3, FIO2 in the last 72 hours. No results for input(s): INR in the last 72 hours. No lab exists for component: INREXT Telemetry reviewed:     
 
  
Assessment/Plan: 1. CVA (cerebral vascular accident) ([facility] [address] (1/30/2019).  CT of the head showed small area of acute/subacute ischemia in the right posterior parietal lobe without hemorrhage. Admit to telemetry. Neuro check, start ASA. Check MRI/MRA of the brain, echocardiogram and BL carotid doppler. consult neurology and PT/OT. Check lipids and A1C.  
 
2.  HTN (hypertension) (1/30/2019. Allow permissive high BP. Monitor. Hold HCTZ for now 3. Alcohol abuse (1/30/2019). He drinks mainly beer. Last drink was 3 days ago. Start CIWA protocol and monitor. 4.  Tobacco abuse (1/30/2019). advised to quit. nicotine patch for now. .  
 
  
 
Previous medical records reviewed Risk of deterioration: high Total time spent with patient: 70 Minutes Care Plan discussed with: Patient, Family, Nursing Staff and >50% of time spent in counseling and coordination of care Discussed:  Care Plan Prophylaxis:  Lovenox Probable Disposition:  Home w/Family 
        
___________________________________________________ Attending Physician: Haily Back MD

## 2019-01-30 NOTE — PROGRESS NOTES
[facility]: MED RECONCILIATION Comments/Recommendations:  
? Patient was awake, alert and oriented eating lunch with family by bedside ? Patient verified no known drug allergies and updated his preferred pharmacy ? Patient stated that he did not take his morning medication ? Patient stated that he uses his albuterol inhaler everyday Medications added: · Albuterol · Lisinopril · Ibuprofen Medications removed: 
 
· None Medications adjusted: 
 
· None Information obtained from: Patient, [facility], [address] [place] [address] pharmacy Allergies: Patient has no known allergies. Prior to Admission Medications:  
 
Medication Documentation Review Audit Prior to Admission Medications Prescriptions Last Dose Informant Patient Reported? Taking? albuterol (VENTOLIN HFA) 90 mcg/actuation inhaler 1/29/2019 at Unknown time Self Yes Yes Sig: Take 2 Puffs by inhalation every four (4) hours as needed for Wheezing or Shortness of Breath. ibuprofen (MOTRIN) 200 mg tablet  Self Yes Yes Sig: Take 200-400 mg by mouth every six (6) hours as needed for Pain. lisinopril (PRINIVIL, ZESTRIL) 20 mg tablet 1/29/2019 at AM Self Yes Yes Sig: Take 20 mg by mouth daily. Facility-Administered Medications: None Thank you, Gurjit Self, Pharmacy Student   Contact:

## 2019-01-30 NOTE — PROGRESS NOTES
Occupational Therapy Note:  Orders received and appreciated. Chart reviewed. Spoke with RN. Pt currently being transferred from the ED to [facility] and pt transport in room. Pt unable to participate in OT eval at this time. Will follow up tomorrow with OT eval.  Thank you for the consult.  
Marcia Luna, OTR/L, CBIS

## 2019-01-30 NOTE — PROGRESS NOTES
Problem: Dysphagia (Adult) Goal: *Acute Goals and Plan of Care (Insert Text) Speech/Swallowing goals initiated 1-30-19 (weekly re-eval 2-7-19) 1)  Tolerate regular diet, thin liquids without s/s aspiration by 2-2-19 2)  Word-retreieval at sentence level 90% by 2-6-19 
3) follow 2-step directions 90% by 2-6-19 
4) auditory comprehension for paragraph material 90% by 2-6-19 Speech LAnguage Pathology evaluation Patient: Wan Poole (46 y.o. male) Date: 1/30/2019 Primary Diagnosis: CVA (cerebral vascular accident) ([facility] CVA (cerebral vascular accident) ([facility] Precautions:   Aspiration ASSESSMENT : 
Based on the objective data described below, the patient presents with mild-moderate receptive-expressive aphasia and mild speech apraxia. This is apparently much improved from his speech this am.   
Patient admitted with dysarthria, aphasia and L UE weakness. Head CT:  R posterior parietal vs posterior superior temporal infarct. Patient is R handed. PMH:  HTN +ETOH, +tobacco, bronchitis. . 
 
Patient will benefit from skilled intervention to address the above impairments. Patients rehabilitation potential is considered to be Good Factors which may influence rehabilitation potential include:  
[]              None noted [x]              Mental ability/status [x]              Medical condition []              Home/family situation and support systems []              Safety awareness []              Pain tolerance/management 
[]              Other: PLAN : 
Recommendations and Planned Interventions: 
Ok for regular diet, thin liquids. Needs daily SLP therapy,. Frequency/Duration: Patient will be followed by speech-language pathology 5 times a week to address goals. Discharge Recommendations: Inpatient Rehab and Outpatient SUBJECTIVE:  
Patient stated Dionicio Floor got up and wanted to tell them to take the trash can out b/c the thing that gets it was coming. OBJECTIVE:  
 
 Past Medical History:  
Diagnosis Date  Bronchitis  Hypertension History reviewed. No pertinent surgical history. Prior Level of Function/Home Situation: lives with family Mental Status: 
Neurologic State: Alert Orientation Level: Oriented to person, Oriented to place, Oriented to time, Oriented to situation Cognition: Decreased command following Perception: (slightly [place]) Perseveration: No perseveration noted Safety/Judgement: Insight into deficits Motor Speech: 
 patient and family report severe slurred speech earlier that was not comprehensible. DDK; P/B: WNL 
T/D:  Apraxic errors vs hearing or comprehension. Unable to correct. G/K:  WNL Language Comprehension and Expression: Auditory Comprehension Auditory Impairment: Yes Response to Basic Yes/No Questions (%): 90 % One-Step Basic Commands (%): 80 %(comprehension vs apraxia) Follows Conversation: No impairment Cueing type: Repetition Verbal Expression Primary Mode of Expression: Verbal 
Initiation: No impairment Automatic Speech Task: No impairment Repetition: Impaired(DDK: 80%. ? apraxia vs compehension or hearing) Repetition cueing type: Verbal 
Repetition cueing amount: Moderate Naming: (noted word-retrieval issues at sentence level with use of circumlocution with low content words) Sentence Formulation: Impaired (%)(80%) Conversation: Fluent Speech Characteristics: Circumlocutions 
 divergent namin, then 7 per category. Patient reports apraxic vs aphasic event this am.  
  
Neuro-Linguistics: SWALLOWING:  
ORAL-MOTOR EVAL: wfl Patient was just finishing lunch: no chewing or swallowing isssues noted with solids or liquids. Pragmatics: 
  
  
Voice: 
  
  
  
  
  
  
  
  
  
  
  
  
  
  
  
 
 
NOMS: The NOMS functional outcome measure was used to quantify this patient's level of expressive language impairment.   Based on the NOMS, the patient was determined to be at level 5 for spoken language expression. NOMS Spoken Language Expression: 
Level 1 (CN): Verbalizations not meaningful to anyone. Level 2 (CM): Few attempts accurate/appropriate. Max cues to elicit automatic/imitative words/phrases. Level 3 (CL): Communication partner responsible for communication; Mod cues for words/phrases meaningful in context Level 4 (CK): Initiate during simple, routine activities w/familiar partner. Mod cues to produce simple sentences Level 5 (Vickki Quiet): Initiates communication with familiar and unfamiliar partners. Min cues for complex sentences. Level 6 (CI): Communicates for most activities. Some limitations still present for vocational/social activities. Rarely cued for complex info Level 7 ([address] [place] [address] Street): Independent communication. MIAH. (2003). National Outcomes Measurement System (NOMS): Adult Speech-Language Pathology User's Guide. Pain: 
Pain Scale 1: Numeric (0 - 10) Pain Intensity 1: 0 After treatment:  
[]              Patient left in no apparent distress sitting up in chair 
[]              Patient left in no apparent distress in bed 
[]              Call bell left within reach 
[]              Nursing notified 
[]              Caregiver present 
[]              Bed alarm activated COMMUNICATION/EDUCATION:  
The patients plan of care including recommendations and planned interventions was discussed with: Registered Nurse. Patient was educated regarding His deficit(s) of aphasia as this relates to His diagnosis of CVA. He demonstrated Good understanding as evidenced by discussion. Family present and understood. REviewed s/s of CVA and BEFAST. []  Patient/family have participated as able in goal setting and plan of care. [x]  Patient/family agree to work toward stated goals and plan of care. [x]  Patient understands intent and goals of therapy, but is neutral about his/her participation. []  Patient is unable to participate in goal setting and plan of care. Thank you for this referral. 
Geoffery Apley, SLP Time Calculation: 15 mins

## 2019-01-30 NOTE — ROUTINE PROCESS
TRANSFER - OUT REPORT: 
 
Verbal report given to Yenni Warren RN (name) on Saúl Arango  being transferred to [facility] (unit) for routine progression of care Report consisted of patients Situation, Background, Assessment and  
Recommendations(SBAR). Information from the following report(s) SBAR and Cardiac Rhythm NSR was reviewed with the receiving nurse. Lines:  
Peripheral IV 01/30/19 Left Antecubital (Active) Site Assessment Clean, dry, & intact 1/30/2019 12:25 PM  
Phlebitis Assessment 0 1/30/2019 12:25 PM  
Infiltration Assessment 0 1/30/2019 12:25 PM  
Dressing Status Clean, dry, & intact 1/30/2019 12:25 PM  
Dressing Type Transparent;Tape 1/30/2019 12:25 PM  
Hub Color/Line Status Flushed 1/30/2019 10:09 AM  
   
Peripheral IV 01/30/19 Right Forearm (Active) Site Assessment Clean, dry, & intact 1/30/2019 12:25 PM  
Phlebitis Assessment 0 1/30/2019 12:25 PM  
Infiltration Assessment 0 1/30/2019 12:25 PM  
Dressing Status Clean, dry, & intact 1/30/2019 12:25 PM  
Dressing Type Transparent;Tape 1/30/2019 12:25 PM  
Hub Color/Line Status Flushed 1/30/2019 11:09 AM  
  
 
Opportunity for questions and clarification was provided. Patient transported with: 
 Monitor Registered Nurse

## 2019-01-30 NOTE — ED TRIAGE NOTES
Brought in by EMS for altered mentation, slurred speech, aphasia at times, and inability to use left arm. Last known well per EMS was 9 or 10 pm last night.

## 2019-01-30 NOTE — PROGRESS NOTES
TRANSFER - IN REPORT: 
 
Verbal report received from JOSE MANUEL(name) on Janett  being received from ED(unit) for routine progression of care Report consisted of patients Situation, Background, Assessment and  
Recommendations(SBAR). Information from the following report(s) SBAR and ED Summary was reviewed with the receiving nurse. Opportunity for questions and clarification was provided. Assessment completed upon patients arrival to unit and care assumed. 1530: Primary Nurse Brody Silvestre RN and Viky RN performed a dual skin assessment on this patient No impairment noted Ethan score is 23 
 
1545: Chelsea (sister) and Anjelica Santos (cousin) at bedside 1630: pt sisters cehlsea to take pts money and keys home with her Bedside and Verbal shift change report given to ABDON (oncoming nurse) by Cain Shields (offgoing nurse). Report included the following information SBAR, Kardex, ED Summary, Intake/Output, Accordion, Recent Results and Cardiac Rhythm NSR.  
 
1910: RECEIVED REPORT FROM MRI. PT POSITIVE FOR STROKE.  DR. Jin Morley NOTIFIED VIA [employer]

## 2019-01-30 NOTE — PROGRESS NOTES
1/30/2019 
3:12 PM 
Case management note Met with patient to discuss discharge planning. Patient lives with his cousin in a single story home. Patient drives and performs ADL's independently. He has no PCP 
[facility] [place] No advance directive Reason for Admission:   CVA RRAT Score:         2 Plan for utilizing home health:      TBD Likelihood of Readmission:  Low/green Transition of Care Plan:           Home with family assistance, out patient follow up. Elizabeth Ugarte, [address]

## 2019-01-30 NOTE — ED NOTES
Pt Throughput: Receiving [facility] Charge RN made aware of patient's bed placement.   
 
Joel Salter RN

## 2019-01-31 VITALS
DIASTOLIC BLOOD PRESSURE: 80 MMHG | SYSTOLIC BLOOD PRESSURE: 122 MMHG | TEMPERATURE: 97.7 F | OXYGEN SATURATION: 96 % | WEIGHT: 132.5 LBS | HEART RATE: 106 BPM | BODY MASS INDEX: 18.97 KG/M2 | RESPIRATION RATE: 20 BRPM | HEIGHT: 70 IN

## 2019-01-31 LAB
ATRIAL RATE: 101 BPM
BASOPHILS # BLD: 0.1 K/UL (ref 0–0.1)
BASOPHILS NFR BLD: 1 % (ref 0–1)
CALCULATED P AXIS, ECG09: 67 DEGREES
CALCULATED R AXIS, ECG10: 68 DEGREES
CALCULATED T AXIS, ECG11: 63 DEGREES
CHOLEST SERPL-MCNC: 145 MG/DL
DIAGNOSIS, 93000: NORMAL
DIFFERENTIAL METHOD BLD: ABNORMAL
EOSINOPHIL # BLD: 0.5 K/UL (ref 0–0.4)
EOSINOPHIL NFR BLD: 4 % (ref 0–7)
ERYTHROCYTE [DISTWIDTH] IN BLOOD BY AUTOMATED COUNT: 13.2 % (ref 11.5–14.5)
EST. AVERAGE GLUCOSE BLD GHB EST-MCNC: 105 MG/DL
HBA1C MFR BLD: 5.3 % (ref 4.2–6.3)
HCT VFR BLD AUTO: 38.9 % (ref 36.6–50.3)
HDLC SERPL-MCNC: 50 MG/DL
HDLC SERPL: 2.9 {RATIO} (ref 0–5)
HGB BLD-MCNC: 13.3 G/DL (ref 12.1–17)
IMM GRANULOCYTES # BLD AUTO: 0 K/UL (ref 0–0.04)
IMM GRANULOCYTES NFR BLD AUTO: 0 % (ref 0–0.5)
LDLC SERPL CALC-MCNC: 72.2 MG/DL (ref 0–100)
LIPID PROFILE,FLP: NORMAL
LYMPHOCYTES # BLD: 2.1 K/UL (ref 0.8–3.5)
LYMPHOCYTES NFR BLD: 17 % (ref 12–49)
MCH RBC QN AUTO: 33.2 PG (ref 26–34)
MCHC RBC AUTO-ENTMCNC: 34.2 G/DL (ref 30–36.5)
MCV RBC AUTO: 97 FL (ref 80–99)
MONOCYTES # BLD: 1.2 K/UL (ref 0–1)
MONOCYTES NFR BLD: 10 % (ref 5–13)
NEUTS SEG # BLD: 8.4 K/UL (ref 1.8–8)
NEUTS SEG NFR BLD: 69 % (ref 32–75)
NRBC # BLD: 0 K/UL (ref 0–0.01)
NRBC BLD-RTO: 0 PER 100 WBC
P-R INTERVAL, ECG05: 132 MS
PLATELET # BLD AUTO: 306 K/UL (ref 150–400)
PMV BLD AUTO: 8.8 FL (ref 8.9–12.9)
Q-T INTERVAL, ECG07: 354 MS
QRS DURATION, ECG06: 78 MS
QTC CALCULATION (BEZET), ECG08: 459 MS
RBC # BLD AUTO: 4.01 M/UL (ref 4.1–5.7)
TRIGL SERPL-MCNC: 114 MG/DL (ref ?–150)
VENTRICULAR RATE, ECG03: 101 BPM
VLDLC SERPL CALC-MCNC: 22.8 MG/DL
WBC # BLD AUTO: 12.3 K/UL (ref 4.1–11.1)

## 2019-01-31 PROCEDURE — 74011250637 HC RX REV CODE- 250/637: Performed by: INTERNAL MEDICINE

## 2019-01-31 PROCEDURE — 92507 TX SP LANG VOICE COMM INDIV: CPT

## 2019-01-31 PROCEDURE — 97165 OT EVAL LOW COMPLEX 30 MIN: CPT

## 2019-01-31 PROCEDURE — 85025 COMPLETE CBC W/AUTO DIFF WBC: CPT

## 2019-01-31 PROCEDURE — 74011250637 HC RX REV CODE- 250/637: Performed by: PSYCHIATRY & NEUROLOGY

## 2019-01-31 PROCEDURE — 36415 COLL VENOUS BLD VENIPUNCTURE: CPT

## 2019-01-31 PROCEDURE — 83036 HEMOGLOBIN GLYCOSYLATED A1C: CPT

## 2019-01-31 PROCEDURE — 97116 GAIT TRAINING THERAPY: CPT

## 2019-01-31 PROCEDURE — 97535 SELF CARE MNGMENT TRAINING: CPT

## 2019-01-31 PROCEDURE — 80061 LIPID PANEL: CPT

## 2019-01-31 PROCEDURE — 74011250637 HC RX REV CODE- 250/637: Performed by: NURSE PRACTITIONER

## 2019-01-31 PROCEDURE — 97161 PT EVAL LOW COMPLEX 20 MIN: CPT

## 2019-01-31 RX ORDER — ASPIRIN 325 MG/1
100 TABLET, FILM COATED ORAL DAILY
Qty: 30 TAB | Refills: 0 | Status: SHIPPED | OUTPATIENT
Start: 2019-02-01 | End: 2019-03-14

## 2019-01-31 RX ORDER — CLOPIDOGREL BISULFATE 75 MG/1
75 TABLET ORAL DAILY
Status: DISCONTINUED | OUTPATIENT
Start: 2019-01-31 | End: 2019-01-31 | Stop reason: HOSPADM

## 2019-01-31 RX ORDER — ATORVASTATIN CALCIUM 10 MG/1
10 TABLET, FILM COATED ORAL DAILY
Status: DISCONTINUED | OUTPATIENT
Start: 2019-01-31 | End: 2019-01-31 | Stop reason: HOSPADM

## 2019-01-31 RX ORDER — FOLIC ACID 1 MG/1
1 TABLET ORAL DAILY
Qty: 30 TAB | Refills: 0 | Status: SHIPPED | OUTPATIENT
Start: 2019-02-01 | End: 2019-03-14

## 2019-01-31 RX ORDER — CLOPIDOGREL BISULFATE 75 MG/1
75 TABLET ORAL DAILY
Qty: 30 TAB | Refills: 1 | Status: SHIPPED | OUTPATIENT
Start: 2019-02-01 | End: 2019-02-11 | Stop reason: SDUPTHER

## 2019-01-31 RX ORDER — ATORVASTATIN CALCIUM 10 MG/1
10 TABLET, FILM COATED ORAL DAILY
Qty: 30 TAB | Refills: 1 | Status: SHIPPED | OUTPATIENT
Start: 2019-01-31 | End: 2019-02-11 | Stop reason: SDUPTHER

## 2019-01-31 RX ORDER — GUAIFENESIN 100 MG/5ML
81 LIQUID (ML) ORAL DAILY
Qty: 30 TAB | Refills: 0 | Status: SHIPPED
Start: 2019-02-01 | End: 2019-02-11 | Stop reason: SDUPTHER

## 2019-01-31 RX ADMIN — Medication 100 MG: at 08:25

## 2019-01-31 RX ADMIN — Medication 10 ML: at 05:23

## 2019-01-31 RX ADMIN — CLOPIDOGREL BISULFATE 75 MG: 75 TABLET ORAL at 09:47

## 2019-01-31 RX ADMIN — ASPIRIN 81 MG 81 MG: 81 TABLET ORAL at 08:25

## 2019-01-31 RX ADMIN — FOLIC ACID 1 MG: 1 TABLET ORAL at 08:25

## 2019-01-31 RX ADMIN — ATORVASTATIN CALCIUM 10 MG: 10 TABLET, FILM COATED ORAL at 14:20

## 2019-01-31 RX ADMIN — Medication 10 ML: at 14:20

## 2019-01-31 NOTE — PROGRESS NOTES
[facility] 
[address] [facility], [address] [place] [address] 
[phone] Medical Progress Note NAME: Piedad Goel :  1970 MRM:  [id number] Date/Time: 2019  7:22 AM 
 
  
Assessment and Plan: 1. CVA (cerebral vascular accident) ([address] (2019). CT of the head showed small area of acute/subacute ischemia in the right posterior parietal lobe without hemorrhage. MRI of the brain showed: acute infarction posterior superior left temporal lobe 
extending into the left parietal lobe. Echocardiogram: PFO. BL carotid doppler is normal. Neurology evaluation appreciated. PT/OT. Check lipids and A1C. Neuro check, ASA.   
  
2. HTN (hypertension) (2019. Allow permissive high BP. Monitor. Hold HCTZ for now  
  
3. Alcohol abuse (2019). He drinks mainly beer. Last drink was 3 days ago prior to admission. CIWA protocol and monitor. No events overnight.    
  
4. Tobacco abuse (2019). advised to quit. nicotine patch for now. .  
  
5. PFO. Discussed with cardiology and recommended ASA and plavix, but if recurrent CVA then it needs to be closed. Subjective: Chief Complaint:  Follow up of pt who was admitted with acute CVA. Feels well. No new neurological deficit of complaint. ROS: 
(bold if positive, if negative) Tolerating PT  Tolerating Diet Objective:  
 
Last 24hrs VS reviewed since prior progress note. Most recent are: 
 
Visit Vitals /80 Pulse (!) 103 Temp 99.8 °F (37.7 °C) Resp 16 Ht 5' 10\" (1.778 m) Wt 60.1 kg (132 lb 8 oz) SpO2 96% BMI 19.01 kg/m² SpO2 Readings from Last 6 Encounters:  
19 96%  
17 97% 07/03/15 95% No intake or output data in the 24 hours ending 19 8131 Physical Exam: 
 
Gen:  Well-developed, well-nourished, in no acute distress HEENT:  Pink conjunctivae, PERRL, hearing intact to voice, moist mucous membranes Neck:  Supple, without masses, thyroid non-tender Resp:  No accessory muscle use, clear breath sounds without wheezes rales or rhonchi 
Card:  No murmurs, normal S1, S2 without thrills, bruits or peripheral edema Abd:  Soft, non-tender, non-distended, normoactive bowel sounds are present, no palpable organomegaly and no detectable hernias Lymph:  No cervical or inguinal adenopathy Musc:  No cyanosis or clubbing Skin:  No rashes or ulcers, skin turgor is good Neuro:  Cranial nerves are grossly intact, no focal motor weakness, follows commands appropriately Psych:  Good insight, oriented to person, place and time, alert 
__________________________________________________________________ Medications Reviewed: (see below) Medications:  
 
Current Facility-Administered Medications Medication Dose Route Frequency  sodium chloride (NS) flush 5-40 mL  5-40 mL IntraVENous Q8H  
 sodium chloride (NS) flush 5-40 mL  5-40 mL IntraVENous PRN  
 acetaminophen (TYLENOL) tablet 650 mg  650 mg Oral Q4H PRN Or  
 acetaminophen (TYLENOL) solution 650 mg  650 mg Per NG tube Q4H PRN Or  
 acetaminophen (TYLENOL) suppository 650 mg  650 mg Rectal Q4H PRN  
 enoxaparin (LOVENOX) injection 40 mg  40 mg SubCUTAneous DAILY  folic acid (FOLVITE) tablet 1 mg  1 mg Oral DAILY  thiamine mononitrate (B-1) tablet 100 mg  100 mg Oral DAILY  LORazepam (ATIVAN) injection 2 mg  2 mg IntraVENous Q1H PRN  
 LORazepam (ATIVAN) injection 4 mg  4 mg IntraVENous Q1H PRN  
 nicotine (NICODERM CQ) 14 mg/24 hr patch 1 Patch  1 Patch TransDERmal DAILY  aspirin chewable tablet 81 mg  81 mg Oral DAILY Lab Data Reviewed: (see below) Lab Review:  
 
Recent Labs  
  01/31/19 
0220 01/30/19 
1044 WBC 12.3* 14.0* HGB 13.3 15.6 HCT 38.9 46.5  380 Recent Labs  
  01/30/19 
1044 01/30/19 
1008   --   
K 4.0  --   
  --   
CO2 25  --   
*  --   
BUN 16  --   
CREA 1.00  --   
 CA 9.5  --   
ALB 3.5  --   
TBILI 0.6  --   
SGOT 20  --   
ALT 19  --   
INR  --  1.0 Lab Results Component Value Date/Time Glucose (POC) 127 (H) 01/30/2019 10:03 AM  
 
No results for input(s): PH, PCO2, PO2, HCO3, FIO2 in the last 72 hours. Recent Labs  
  01/30/19 
1008 INR 1.0 All Micro Results None I have reviewed notes of prior 24hr. Other pertinent lab: Total time spent with patient: 28 Care Plan discussed with: Patient, Nursing Staff and >50% of time spent in counseling and coordination of care Discussed:  Care Plan Prophylaxis:  Lovenox Disposition:  Home w/Family 
        
___________________________________________________ Attending Physician: Ruth Thomas MD

## 2019-01-31 NOTE — PROGRESS NOTES
Problem: Falls - Risk of 
Goal: *Absence of Falls Document Orvel Buffy Fall Risk and appropriate interventions in the flowsheet. Outcome: Progressing Towards Goal 
Fall Risk Interventions: 
  
 
  
 
Medication Interventions: Assess postural VS orthostatic hypotension, Bed/chair exit alarm, Evaluate medications/consider consulting pharmacy, Patient to call before getting OOB, Teach patient to arise slowly History of Falls Interventions: Bed/chair exit alarm, Consult care management for discharge planning, Door open when patient unattended, Evaluate medications/consider consulting pharmacy, Investigate reason for fall

## 2019-01-31 NOTE — PROGRESS NOTES
Bedside and Verbal shift change report given to Cain Shileds (oncoming nurse) by Kunal Lomeli (offgoing nurse). Report included the following information SBAR, Kardex, Intake/Output, Accordion and Recent Results.

## 2019-01-31 NOTE — DISCHARGE SUMMARY
Hospitalist Discharge Summary     Patient ID:    Jai Be  [id number]  46 y.o.  1970    Admit date: 1/30/2019    Discharge date and time: 1/31/2019    Admission Diagnoses: CVA (cerebral vascular accident) [facility])  CVA (cerebral vascular accident) [facility])    Chronic Diagnoses:    Problem List as of 1/31/2019 Date Reviewed: 1/30/2019          Codes Class Noted - Resolved    CVA (cerebral vascular accident) [facility]) ICD-10-CM: I63.9  ICD-9-CM: 434.91  1/30/2019 - Present        HTN (hypertension) ICD-10-CM: I10  ICD-9-CM: 401.9  1/30/2019 - Present        Alcohol abuse ICD-10-CM: F10.10  ICD-9-CM: 305.00  1/30/2019 - Present        Tobacco abuse ICD-10-CM: Z72.0  ICD-9-CM: 305.1  1/30/2019 - Present              Discharge Medications:   Current Discharge Medication List      START taking these medications    Details   aspirin 81 mg chewable tablet Take 1 Tab by mouth daily. Qty: 30 Tab, Refills: 0      clopidogrel (PLAVIX) 75 mg tab Take 1 Tab by mouth daily. Qty: 30 Tab, Refills: 1      folic acid (FOLVITE) 1 mg tablet Take 1 Tab by mouth daily. Qty: 30 Tab, Refills: 0      thiamine mononitrate (B-1) 100 mg tablet Take 1 Tab by mouth daily. Qty: 30 Tab, Refills: 0         CONTINUE these medications which have NOT CHANGED    Details   albuterol (VENTOLIN HFA) 90 mcg/actuation inhaler Take 2 Puffs by inhalation every four (4) hours as needed for Wheezing or Shortness of Breath. STOP taking these medications       lisinopril (PRINIVIL, ZESTRIL) 20 mg tablet Comments:   Reason for Stopping:         ibuprofen (MOTRIN) 200 mg tablet Comments:   Reason for Stopping: Follow up Care:    1. None in 1-2 weeks  2. Neurology     Diet:  Cardiac Diet    Disposition:  Home. Advanced Directive:    Discharge Exam:  See today's note.     CONSULTATIONS: Nephrology    Significant Diagnostic Studies:   Recent Labs     01/31/19  0220 01/30/19  1044   WBC 12.3* 14.0*   HGB 13.3 15.6   HCT 38.9 46.5    380 Recent Labs     01/30/19  1044      K 4.0      CO2 25   BUN 16   CREA 1.00   *   CA 9.5     Recent Labs     01/30/19  1044   SGOT 20   ALT 19   *   TBILI 0.6   TP 7.7   ALB 3.5   GLOB 4.2*     Recent Labs     01/30/19  1008   INR 1.0      No results for input(s): FE, TIBC, PSAT, FERR in the last 72 hours. No results for input(s): PH, PCO2, PO2 in the last 72 hours. No results for input(s): CPK, CKMB in the last 72 hours. No lab exists for component: TROPONINI  Lab Results   Component Value Date/Time    Glucose (POC) 127 (H) 01/30/2019 10:03 AM             HOSPITAL COURSE & TREATMENT RENDERED:   1. CVA (cerebral vascular accident) ([address] (1/30/2019). CT of the head showed small area of acute/subacute ischemia in the right posterior parietal lobe without hemorrhage. MRI of the brain showed: acute infarction posterior superior left temporal lobe  extending into the left parietal lobe. Echocardiogram: PFO. BL carotid doppler is normal. Neurology evaluation appreciated. PT/OT. Check lipids and A1C. Neuro check, ASA.       2.  HTN (hypertension) (1/30/2019. Allow permissive high BP. Monitor. Hold HCTZ for now      3.  Alcohol abuse (1/30/2019). He drinks mainly beer. Last drink was 3 days ago prior to admission. CIWA protocol and monitor. No events overnight.        4.  Tobacco abuse (1/30/2019). advised to quit. nicotine patch for now. .      5. PFO.  Discussed with cardiology and recommended ASA and plavix, but if recurrent CVA then it needs to be closed.        Discharged in improved condition       Signed:  Mary Stuart MD  1/31/2019  12:04 PM

## 2019-01-31 NOTE — PROGRESS NOTES
physical Therapy neuro EVALUATION/discharge Patient: Roe Williamson (46 y.o. male) Date: 1/31/2019 Primary Diagnosis: CVA (cerebral vascular accident) ([facility] CVA (cerebral vascular accident) ([facility] Precautions:   Fall ASSESSMENT : 
Based on the objective data described below, the patient presents with decreased strength and balance following admission s/p fall and dropping objects while at home. Patient has an MRI confirmed Left temporal and anterior parietal infarct and CT confirmed acute/subacute  Right posterior parietal infarct. Patient has multiple co-morbidities including: HTN, smoker, ETOH abuse and now an ECHO confirmed PFO being followed by cardiology. Patient received sitting at edge of bed and agreeable to therapy. Patient overall is Independent for all transfers, gait and bed mobility. Patient did have elevated heart rate with activity: 122-125 bpm, at rest 106 bpm.  He is MOD I using a hand rail on the stairs. He scored a 51/56 on the ALCANTARA placing him at a low fall risk. Patient has symmetrical strength and coordination and intact sensation. Patient is currently at his baseline for mobility and will not require any additional PT services while in the hospital or at discharge. . 
 
Further skilled acute physical therapy is not indicated at this time. PLAN : 
Discharge Recommendations: None Further Equipment Recommendations for Discharge: SUBJECTIVE:  
Patient stated Ishaan Pinto thought I had the flu.  OBJECTIVE DATA SUMMARY:  
HISTORY:   
Past Medical History:  
Diagnosis Date  Bronchitis  Hypertension History reviewed. No pertinent surgical history. Prior Level of Function/Home Situation: see above Personal factors and/or comorbidities impacting plan of care: see below Home Situation Home Environment: Private residence # Steps to Enter: 3 Rails to Enter: Yes Hand Rails : Bilateral 
One/Two Story Residence: One story Living Alone: No 
 Support Systems: Family member(s) Patient Expects to be Discharged to[de-identified] Private residence Current DME Used/Available at Home: None Tub or Shower Type: Tub/Shower combination EXAMINATION/PRESENTATION/DECISION MAKING: Critical Behavior: 
Neurologic State: Alert Orientation Level: Oriented X4 Cognition: Appropriate decision making Safety/Judgement: Decreased insight into deficits Hearing: Auditory Auditory Impairment: None Skin:  All exposed intact Edema: none noted Range Of Motion: 
AROM: Within functional limits PROM: Within functional limits Strength:   
Strength: Within functional limits Tone & Sensation:  
Tone: Normal 
  
  
  
  
Sensation: Intact Coordination: 
Coordination: Within functional limits Vision:  
Corrective Lenses: Reading glasses Functional Mobility: 
Bed Mobility: 
Rolling: Independent Supine to Sit: Independent Sit to Supine: Independent Transfers: 
Sit to Stand: Independent Stand to Sit: Independent Bed to Chair: Independent Balance:  
Sitting: Intact Standing: Intact Ambulation/Gait Training: 
Distance (ft): 300 Feet (ft) Assistive Device: Gait belt Ambulation - Level of Assistance: Independent Gait Description (WDL): Exceptions to [facility] Stair Training: 
Number of Stairs Trained: 6 Stairs - Level of Assistance: Modified independent Rail Use: Right Therapeutic Exercises:  
 
 
Functional Measure: 
Medel Balance Test: 
 
Sitting to Standin Standing Unsupported: 4 Sitting with Back Unsupported: 4 Standing to Sittin Transfers: 4 Standing Unsupported with Eyes Closed: 4 Standing Unsupported with Feet Together: 4 Reach Forward with Outstretched Arm: 4  Object: 4 Turn to Look Over Shoulders: 4 Turn 360 Degrees: 4 Alternate Foot on Step/Stool: 4 Standing Unsupported One Foot in Front: 2 Stand on One Le Total: 51 
 
 
 
 56=Maximum possible score;  
0-20=High fall risk 21-40=Moderate fall risk 41-56=Low fall risk Physical Therapy Evaluation Charge Determination History Examination Presentation Decision-Making HIGH Complexity :3+ comorbidities / personal factors will impact the outcome/ POC  LOW Complexity : 1-2 Standardized tests and measures addressing body structure, function, activity limitation and / or participation in recreation  LOW Complexity : Stable, uncomplicated  Other outcome measures ALCANTARA  LOW Based on the above components, the patient evaluation is determined to be of the following complexity level: LOW Pain: 
Pain Scale 1: Numeric (0 - 10) Pain Intensity 1: 0 Activity Tolerance: No complications with upright activity Please refer to the flowsheet for vital signs taken during this treatment. After treatment:  
[x]         Patient left in no apparent distress sitting up in chair 
[]         Patient left in no apparent distress in bed 
[x]         Call bell left within reach [x]         Nursing notified 
[x]         Caregiver present 
[]         Bed alarm activated COMMUNICATION/EDUCATION:  
Patient was educated regarding His deficit(s) of balance and strength as this relates to His diagnosis of CVA. He demonstrated Fair understanding as evidenced by verbal feedback. Patient and/or family was verbally educated on the BE FAST acronym for signs/symptoms of CVA and TIA. BE FAST was written on patient's communication board  for visual education and reinforcement. All questions answered with patient indicating fair understanding. [x]   Fall prevention education was provided and the patient/caregiver indicated understanding. [x]   Patient/family have participated as able and agree with findings and recommendations. []   Patient is unable to participate in plan of care at this time.  
 
Findings and recommendations were discussed with: Occupational Therapist and Registered Nurse Thank you for this referral. 
Lazarus Burks, PT, DPT Time Calculation: 14 mins

## 2019-01-31 NOTE — DISCHARGE INSTRUCTIONS
ACUTE DIAGNOSES:  CVA (cerebral vascular accident) [facility])  CVA (cerebral vascular accident) [facility])    CHRONIC MEDICAL DIAGNOSES:  Problem List as of 1/31/2019 Date Reviewed: 1/30/2019          Codes Class Noted - Resolved    CVA (cerebral vascular accident) [facility]) ICD-10-CM: I63.9  ICD-9-CM: 434.91  1/30/2019 - Present        HTN (hypertension) ICD-10-CM: I10  ICD-9-CM: 401.9  1/30/2019 - Present        Alcohol abuse ICD-10-CM: F10.10  ICD-9-CM: 305.00  1/30/2019 - Present        Tobacco abuse ICD-10-CM: Z72.0  ICD-9-CM: 305.1  1/30/2019 - Present              DISCHARGE MEDICATIONS:          · It is important that you take the medication exactly as they are prescribed. · Keep your medication in the bottles provided by the pharmacist and keep a list of the medication names, dosages, and times to be taken in your wallet. · Do not take other medications without consulting your doctor. DIET:  Cardiac Diet    ACTIVITY: Activity as tolerated    ADDITIONAL INFORMATION: If you experience any of the following symptoms then please call your primary care physician or return to the emergency room if you cannot get hold of your doctor: Fever, chills, nausea, vomiting, diarrhea, change in mentation, falling, bleeding, shortness of breath. FOLLOW UP CARE:  Primary care physician. you are to call and set up an appointment to see them in 2 week    [address] [facility] [address] [place] [address],[address] [facility], [address] [facility]      Information obtained by :  I understand that if any problems occur once I am at home I am to contact my physician. I understand and acknowledge receipt of the instructions indicated above.                                                                                                                                            Physician's or R.N.'s Signature                                                                  Date/Time Patient or Representative Signature                                                          Date/Time

## 2019-01-31 NOTE — PROGRESS NOTES
Occupational Therapy neurological EVALUATION with discharge Patient: Wan Poole (46 y.o. male) Date: 1/31/2019 Primary Diagnosis: CVA (cerebral vascular accident) ([facility] CVA (cerebral vascular accident) ([facility] Precautions:   Fall ASSESSMENT: 
Based on the objective data described below, the patient presents with hospital admission secondary to onset of speech difficulty, RUE weakness and decreased balance. Patient with MRI reveals left acute posterior/superior temporal and anterior parietal infarct. CT confirms acute/sub acute R posterior parietal infarct. Patient received supine in bed with HOB elevated, agreeable to evaluation. Patient sister and family present in room. Patient reports symptoms resolved. Patient able to demonstrate ADL tasks without assistance today, demonstrates good UE strength and coordination, and scores 66/66 for [facility] UE assessment, indicating no UE deficits at this time. Patient and family educated regarding BE FAST protocol and verbalized understanding. Further skilled acute occupational therapy is not indicated at this time. Discharge Recommendations: None Further Equipment Recommendations for Discharge: none SUBJECTIVE:  
Patient stated I feel like myself now.  OBJECTIVE DATA SUMMARY:  
HISTORY:  
Past Medical History:  
Diagnosis Date  Bronchitis  Hypertension History reviewed. No pertinent surgical history. Prior Level of Function/Environment/Context: independent Occupations in which the patient is/was successful, what are the barriers preventing that success:  
Performance Patterns (routines, roles, habits, and rituals):  
Personal Interests and/or values:  
Expanded or extensive additional review of patient history:  
 
Home Situation Home Environment: Private residence # Steps to Enter: 3 Rails to Enter: Yes Hand Rails : Bilateral 
One/Two Story Residence: One story Living Alone: No 
Support Systems: Family member(s) Patient Expects to be Discharged to[de-identified] Private residence Current DME Used/Available at Home: None Tub or Shower Type: Tub/Shower combination Hand dominance: Right EXAMINATION OF PERFORMANCE DEFICITS: 
Cognitive/Behavioral Status: 
Neurologic State: Alert Orientation Level: Oriented X4 Cognition: Appropriate decision making Perception: ([place]) Perseveration: No perseveration noted Safety/Judgement: Decreased insight into deficits Skin: intact as seen Edema: none noted Hearing: Auditory Auditory Impairment: None Vision/Perceptual:   
    
    
    
  
    
    
Corrective Lenses: Reading glasses Range of Motion: 
AROM: Within functional limits PROM: Within functional limits Strength: 
 
Strength: Within functional limits Coordination: 
Coordination: Within functional limits Tone & Sensation: 
Tone: Normal 
Sensation: Intact Balance: 
Sitting: Intact Standing: Intact Functional Mobility and Transfers for ADLs:Bed Mobility: 
Rolling: Independent Supine to Sit: Independent Sit to Supine: Independent Transfers: 
Sit to Stand: Independent Functional Transfers Bathroom Mobility: Independent Toilet Transfer : Independent Bed to Chair: Independent ADL Assessment: 
Feeding: Independent Oral Facial Hygiene/Grooming: Independent Bathing: Independent Upper Body Dressing: Independent Lower Body Dressing: Independent Toileting: Independent ADL Intervention and task modifications: 
  
 
  
 
  
 
  
 
  
 
  
 
  
 
Cognitive Retraining Safety/Judgement: Decreased insight into deficits Therapeutic Exercise: 
  
 
Functional Measure:  
Fugl-Kruse Assessment of Motor Recovery after Stroke:  
 
Reflex Activity Flexors/Biceps/Fingers: Can be elicited Extensors/Triceps: Can be elicited Reflex Subtotal: 4 Volitional Movement Within Synergies Shoulder Retraction: Full Shoulder Elevation: Full Shoulder Abduction (90 degrees): Full Shoulder External Rotation: Full Elbow Flexion: Full Forearm Supination: Full Shoulder Adduction/Internal Rotation: Full Elbow Extension: Full Forearm Pronation: Full Subtotal: 18 
 
Volitional Movement Mixing Synergies Hand to Lumbar Spine: Full Shoulder Flexion (0-90 degrees): Full Pronation-Supination: Full Subtotal: 6 Volitional Movement With Little or No Synergy Shoulder Abduction (0-90 degrees): Full Shoulder Flexion ( degrees): Full Pronation/Supination: Full Subtotal : 6 Normal Reflex Activity Biceps, Triceps, Finger Flexors: Full Subtotal : 2 Upper Extremity Total  
Upper Extremity Total: 36 Wrist 
Stability at 15 Degree Dorsiflexion: Full Repeated Dorsiflexion/ Volar Flexion: Full Stability at 15 Degree Dorsiflexion: Full Repeated Dorsiflexion/ Volar Flexion: Full Circumduction: Full Wrist Total: 10 Hand Mass Flexion: Full Mass Extension: Full Grasp A: Full Grasp B: Full Grasp C: Full Grasp D: Full Grasp E: Full Hand Total: 14 
 
Coordination/Speed Tremor: None Dysmetria: None Time: <1s Coordination/Speed Total : 6 Total A-D Total A-D (Motor Function): 23/41 This is a reliable/valid measure of arm function after a neurological event. It has established value to characterize functional status and for measuring spontaneous and therapy-induced recovery; tests proximal and distal motor functions. Fugl-Kruse Assessment  UE scores recorded between five and 30 days post neurologic event can be used to predict UE recovery at six months post neurologic event. Severe = 0-21 points Moderately Severe = 22-33 points Moderate = 34-47 points Mild = 48-66 points SAMI Holder, VERONICA Cruz, & VERONICA Shi (1992). Measurement of motor recovery after stroke: Outcome assessment and sample size requirements. Stroke, 23, pp. 7351-3171. ------------------------------------------------------------------------------------------------------------------------------------------------------------------MCID: 
Stroke: Preet Aldana et al, 2001; n = 171; mean age 79 (6) years; assessed within 16 (12) days of stroke, Acute Stroke) FMA Motor Scores from Admission to Discharge 10 point increase in FMA Upper Extremity = 1.5 change in discharge FIM  
10 point increase in FMA Lower Extremity = 1.9 change in discharge FIM 
MDC:  
Stroke:  
Perry Brito et al, 2008, n = 14, mean age = 59.9 (14.6) years, assessed on average 14 (6.5) months post stroke, Chronic Stroke) FMA = 5.2 points for the Upper Extremity portion of the assessment Occupational Therapy Evaluation Charge Determination History Examination Decision-Making LOW Complexity : Brief history review  LOW Complexity : 1-3 performance deficits relating to physical, cognitive , or psychosocial skils that result in activity limitations and / or participation restrictions  LOW Complexity : No comorbidities that affect functional and no verbal or physical assistance needed to complete eval tasks Based on the above components, the patient evaluation is determined to be of the following complexity level: LOW Pain: 
Pain Scale 1: Numeric (0 - 10) Pain Intensity 1: 0 Activity Tolerance: VSS Please refer to the flowsheet for vital signs taken during this treatment. After treatment:  
[]  Patient left in no apparent distress sitting up in chair 
[x]  Patient left in no apparent distress in bed 
[x]  Call bell left within reach [x]  Nursing notified 
[x]  Caregiver present 
[]  Bed alarm activated COMMUNICATION/EDUCATION:  
Findings and recommendations were discussed with: Physical Therapist and Registered Nurse Patient was educated regarding his deficit(s) of speech  as this relates to his diagnosis of CVA.   He demonstrated Good understanding as evidenced by verbal confirmation. Patient and/or family was verbally educated on the BE FAST acronym for signs/symptoms of CVA and TIA. Informed patient to refer to the Stroke Binder for further BE FAST information. All questions answered with patient indicating good understanding. [x]      Home safety education was provided and the patient/caregiver indicated understanding. []      Patient/family have participated as able and agree with findings and recommendations. []      Patient is unable to participate in plan of care at this time. Thank you for this referral. 
Real Novak, OTR/L Time Calculation: 27 mins

## 2019-01-31 NOTE — PROGRESS NOTES
Discharge order written. Notified PT of need of evaluation prior to discharge. Updated AVS MAR with last dose given. Dr. Miguel Angel Ghosh has provided RX for lipitor, folic acid, B-1, and plavix. All Care Plans and Education are complete.

## 2019-01-31 NOTE — PROGRESS NOTES
Problem: Dysphagia (Adult) Goal: *Acute Goals and Plan of Care (Insert Text) Speech/Swallowing goals initiated 19 (weekly re-eval 19) 1)  Tolerate regular diet, thin liquids without s/s aspiration by 19 2)  Word-retreieval at sentence level 90% by 19 
3) follow 2-step directions 90% by 19 
4) auditory comprehension for paragraph material 90% by 19 Speech language pathology treatment Patient: Akiko Muñoz (46 y.o. male) Date: 2019 Diagnosis: CVA (cerebral vascular accident) ([facility] CVA (cerebral vascular accident) ([facility] <principal problem not specified> 
   
 
ASSESSMENT: 
Still with mild processing issues vs [place] vs both. Mild expressive aphasia, but functional.   
 
Progression toward goals: 
[x]       Improving appropriately and progressing toward goals 
[]       Improving slowly and progressing toward goals 
[]       Not making progress toward goals and plan of care will be adjusted PLAN: 
Patient continues to benefit from skilled intervention to address the above impairments. Continue treatment per established plan of care. Discharge Recommendations:  Outpatient if aphasia persists SUBJECTIVE:  
Patient stated I'm all better. OBJECTIVE:  
Mental Status: 
Neurologic State: Alert Orientation Level: Oriented X4 Cognition: Appropriate decision making Perception: ([place]) Perseveration: No perseveration noted Safety/Judgement: Decreased insight into deficits Treatment & Interventions: Motor Speech: WNL Language Comprehension and Expression: 
  
Verbal Expression 
  
  
  
 confrontation namin% Sentence formatuliaton: 100% Conversation: WFL. Auditory comprehension:  
General info y/n ?'s 80% compartive y/n ?'s 90% 1-step directions 100% 2-step directions 80% Neuro-Linguistics: 
  
  
  
  
  
  
  
  
  
  
  
  
  
  
  
  
Voice: Response & Tolerance to Activities: 
  
Pain: 
Pain Scale 1: Numeric (0 - 10) Pain Intensity 1: 0 After treatment:  
[]       Patient left in no apparent distress sitting up in chair 
[]       Patient left in no apparent distress in bed 
[]       Call bell left within reach 
[]       Nursing notified 
[]       Caregiver present 
[]       Bed alarm activated COMMUNICATION/COLLABORATION:  
Patient was educated regarding His deficit(s) of dysphagia  as this relates to His diagnosis of CVA. He demonstrated Good understanding as evidenced by discussion. Sister prseent as well. . 
 
The patients plan of care was discussed with: Registered Nurse Magan Bower SLP Time Calculation: 15 mins

## 2019-02-03 LAB
LEFT CCA DIST DIAS: 19.2 CM/S
LEFT CCA DIST SYS: 61.2 CM/S
LEFT CCA PROX DIAS: 25.3 CM/S
LEFT CCA PROX SYS: 90.4 CM/S
LEFT ECA DIAS: 12.76 CM/S
LEFT ECA SYS: 80.6 CM/S
LEFT ICA DIST DIAS: 25.2 CM/S
LEFT ICA DIST SYS: 63.6 CM/S
LEFT ICA MID DIAS: 24.1 CM/S
LEFT ICA MID SYS: 64.7 CM/S
LEFT ICA PROX DIAS: 16.4 CM/S
LEFT ICA PROX SYS: 49.4 CM/S
LEFT ICA/CCA SYS: 1.06
LEFT SUBCLAVIAN DIAS: 16.03 CM/S
LEFT SUBCLAVIAN SYS: 132.2 CM/S
LEFT VERTEBRAL DIAS: 13.08 CM/S
LEFT VERTEBRAL SYS: 32.9 CM/S
RIGHT CCA DIST DIAS: 15.6 CM/S
RIGHT CCA DIST SYS: 62.8 CM/S
RIGHT CCA PROX DIAS: 21.5 CM/S
RIGHT CCA PROX SYS: 88.4 CM/S
RIGHT ECA DIAS: 15.55 CM/S
RIGHT ECA SYS: 102.2 CM/S
RIGHT ICA DIST DIAS: 21.9 CM/S
RIGHT ICA DIST SYS: 54.2 CM/S
RIGHT ICA MID DIAS: 16.7 CM/S
RIGHT ICA MID SYS: 67.2 CM/S
RIGHT ICA PROX DIAS: 21.9 CM/S
RIGHT ICA PROX SYS: 56.8 CM/S
RIGHT ICA/CCA SYS: 1.1
RIGHT SUBCLAVIAN DIAS: 11.63 CM/S
RIGHT SUBCLAVIAN SYS: 98.4 CM/S
RIGHT VERTEBRAL DIAS: 10.22 CM/S
RIGHT VERTEBRAL SYS: 29.6 CM/S

## 2019-02-11 ENCOUNTER — OFFICE VISIT (OUTPATIENT)
Dept: NEUROLOGY | Age: 49
End: 2019-02-11

## 2019-02-11 VITALS
BODY MASS INDEX: 20.33 KG/M2 | DIASTOLIC BLOOD PRESSURE: 78 MMHG | HEIGHT: 70 IN | WEIGHT: 142 LBS | SYSTOLIC BLOOD PRESSURE: 110 MMHG

## 2019-02-11 DIAGNOSIS — I63.9 ACUTE CVA (CEREBROVASCULAR ACCIDENT) (HCC): Primary | ICD-10-CM

## 2019-02-11 RX ORDER — CLOPIDOGREL BISULFATE 75 MG/1
75 TABLET ORAL DAILY
Qty: 30 TAB | Refills: 5 | Status: SHIPPED | OUTPATIENT
Start: 2019-02-11

## 2019-02-11 RX ORDER — GUAIFENESIN 100 MG/5ML
81 LIQUID (ML) ORAL DAILY
Qty: 30 TAB | Refills: 5 | Status: SHIPPED | OUTPATIENT
Start: 2019-02-11

## 2019-02-11 RX ORDER — ATORVASTATIN CALCIUM 10 MG/1
10 TABLET, FILM COATED ORAL DAILY
Qty: 30 TAB | Refills: 5 | Status: SHIPPED | OUTPATIENT
Start: 2019-02-11

## 2019-02-11 NOTE — PROGRESS NOTES
Huyen Hogan is a 46 y.o. male who presents with the following  Chief Complaint   Patient presents with    Cerebrovascular Accident       HPI     Patient comes in with sister for a follow up for stroke. HE has been doing great since discharge. Nothing is hanging around with him. No trouble with numbness, tingling, weakness, speech, or vision. No trouble with functioning, memory. On Lipitor, Plavix, Asirin. As a recap, he went ED c/o difficulty talking, RT arm weakness, which started this overnight. He says that he was fine before he went to bed but when he tried to speak to his cousin this morning he found that he could not get his words out. His cousin noticed that he had facial droop and was not moving his right arm. Later, he went to the kitchen and started dropping things, which he tried to hold on his RT hand. He also fell. Denies leg weakness or vision problem. stopped smoking. Drinking lightly. No Known Allergies    Current Outpatient Medications   Medication Sig    clopidogrel (PLAVIX) 75 mg tab Take 1 Tab by mouth daily.  atorvastatin (LIPITOR) 10 mg tablet Take 1 Tab by mouth daily.  aspirin 81 mg chewable tablet Take 1 Tab by mouth daily.  folic acid (FOLVITE) 1 mg tablet Take 1 Tab by mouth daily.  thiamine mononitrate (B-1) 100 mg tablet Take 1 Tab by mouth daily.  albuterol (VENTOLIN HFA) 90 mcg/actuation inhaler Take 2 Puffs by inhalation every four (4) hours as needed for Wheezing or Shortness of Breath. No current facility-administered medications for this visit. Social History     Tobacco Use   Smoking Status Current Every Day Smoker    Packs/day: 2.00   Smokeless Tobacco Never Used       Past Medical History:   Diagnosis Date    Bronchitis     Hypertension        History reviewed. No pertinent surgical history.     Family History   Problem Relation Age of Onset    COPD Mother    [facility] Father     COPD Father        Social History Socioeconomic History    Marital status: SINGLE     Spouse name: Not on file    Number of children: Not on file    Years of education: Not on file    Highest education level: Not on file   Tobacco Use    Smoking status: Current Every Day Smoker     Packs/day: 2.00    Smokeless tobacco: Never Used   Substance and Sexual Activity    Alcohol use: Yes     Alcohol/week: 25.2 oz     Types: 42 Cans of beer per week     Comment: daily    Drug use: No       Review of Systems   Eyes: Negative for blurred vision, double vision and photophobia. Respiratory: Negative for shortness of breath and wheezing. Cardiovascular: Negative for chest pain and palpitations. Gastrointestinal: Negative for vomiting. Musculoskeletal: Negative for back pain, falls and neck pain. Neurological: Negative for dizziness, tingling, seizures, loss of consciousness and headaches. Psychiatric/Behavioral: Negative for memory loss and substance abuse. The patient is not nervous/anxious. Remainder of comprehensive review is negative. Physical Exam :    Visit Vitals  /78   Ht 5' 10\" (1.778 m)   Wt 64.4 kg (142 lb)   BMI 20.37 kg/m²       General: Well defined, nourished, and groomed individual in no acute distress.    Neck: Supple, nontender, no bruits, no pain with resistance to active range of motion.    Heart: Regular rate and rhythm, no murmurs, rub, or gallop. Normal S1S2. Lungs: Clear to auscultation bilaterally with equal chest expansion, no cough, no wheeze  Musculoskeletal: Extremities revealed no edema and had full range of motion of joints.    Psych: Good mood and bright affect    NEUROLOGICAL EXAMINATION:    Mental Status: Alert and oriented to person, place, and time    Cranial Nerves:    II, III, IV, VI: Visual acuity grossly intact. Visual fields are normal.    Pupils are equal, round, and reactive to light and accommodation.    Extra-ocular movements are full and fluid.  Fundoscopic exam was benign, no ptosis or nystagmus.    V-XII: Hearing is grossly intact. Facial features are symmetric, with normal sensation and strength. The palate rises symmetrically and the tongue protrudes midline. Sternocleidomastoids 5/5. Motor Examination: Normal tone, bulk, and strength, 5/5 muscle strength throughout. Coordination: Finger to nose was normal. No resting or intention tremor    Gait and Station: Steady while walking. Normal arm swing. No pronator drift. No muscle wasting or fasiculations noted. Reflexes: DTRs 2+ throughout. Results for orders placed or performed during the hospital encounter of 01/30/19   SAMPLES BEING HELD   Result Value Ref Range    SAMPLES BEING HELD SST,MARYANN,GRN     COMMENT        Add-on orders for these samples will be processed based on acceptable specimen integrity and analyte stability, which may vary by analyte. CBC WITH AUTOMATED DIFF   Result Value Ref Range    WBC 14.0 (H) 4.1 - 11.1 K/uL    RBC 4.81 4.10 - 5.70 M/uL    HGB 15.6 12.1 - 17.0 g/dL    HCT 46.5 36.6 - 50.3 %    MCV 96.7 80.0 - 99.0 FL    MCH 32.4 26.0 - 34.0 PG    MCHC 33.5 30.0 - 36.5 g/dL    RDW 13.2 11.5 - 14.5 %    PLATELET 024 611 - 187 K/uL    MPV 9.0 8.9 - 12.9 FL    NRBC 0.0 0  WBC    ABSOLUTE NRBC 0.00 0.00 - 0.01 K/uL    NEUTROPHILS 76 (H) 32 - 75 %    LYMPHOCYTES 13 12 - 49 %    MONOCYTES 8 5 - 13 %    EOSINOPHILS 2 0 - 7 %    BASOPHILS 1 0 - 1 %    IMMATURE GRANULOCYTES 0 0.0 - 0.5 %    ABS. NEUTROPHILS 10.7 (H) 1.8 - 8.0 K/UL    ABS. LYMPHOCYTES 1.8 0.8 - 3.5 K/UL    ABS. MONOCYTES 1.1 (H) 0.0 - 1.0 K/UL    ABS. EOSINOPHILS 0.3 0.0 - 0.4 K/UL    ABS. BASOPHILS 0.1 0.0 - 0.1 K/UL    ABS. IMM.  GRANS. 0.1 (H) 0.00 - 0.04 K/UL    DF AUTOMATED     METABOLIC PANEL, COMPREHENSIVE   Result Value Ref Range    Sodium 139 136 - 145 mmol/L    Potassium 4.0 3.5 - 5.1 mmol/L    Chloride 102 97 - 108 mmol/L    CO2 25 21 - 32 mmol/L    Anion gap 12 5 - 15 mmol/L    Glucose 106 (H) 65 - 100 mg/dL    BUN 16 6 - 20 MG/DL    Creatinine 1.00 0.70 - 1.30 MG/DL    BUN/Creatinine ratio 16 12 - 20      GFR est AA >60 >60 ml/min/1.73m2    GFR est non-AA >60 >60 ml/min/1.73m2    Calcium 9.5 8.5 - 10.1 MG/DL    Bilirubin, total 0.6 0.2 - 1.0 MG/DL    ALT (SGPT) 19 12 - 78 U/L    AST (SGOT) 20 15 - 37 U/L    Alk. phosphatase 144 (H) 45 - 117 U/L    Protein, total 7.7 6.4 - 8.2 g/dL    Albumin 3.5 3.5 - 5.0 g/dL    Globulin 4.2 (H) 2.0 - 4.0 g/dL    A-G Ratio 0.8 (L) 1.1 - 2.2     ETHYL ALCOHOL   Result Value Ref Range    ALCOHOL(ETHYL),SERUM <10 <10 MG/DL   CBC WITH AUTOMATED DIFF   Result Value Ref Range    WBC 12.3 (H) 4.1 - 11.1 K/uL    RBC 4.01 (L) 4.10 - 5.70 M/uL    HGB 13.3 12.1 - 17.0 g/dL    HCT 38.9 36.6 - 50.3 %    MCV 97.0 80.0 - 99.0 FL    MCH 33.2 26.0 - 34.0 PG    MCHC 34.2 30.0 - 36.5 g/dL    RDW 13.2 11.5 - 14.5 %    PLATELET 194 589 - 869 K/uL    MPV 8.8 (L) 8.9 - 12.9 FL    NRBC 0.0 0  WBC    ABSOLUTE NRBC 0.00 0.00 - 0.01 K/uL    NEUTROPHILS 69 32 - 75 %    LYMPHOCYTES 17 12 - 49 %    MONOCYTES 10 5 - 13 %    EOSINOPHILS 4 0 - 7 %    BASOPHILS 1 0 - 1 %    IMMATURE GRANULOCYTES 0 0.0 - 0.5 %    ABS. NEUTROPHILS 8.4 (H) 1.8 - 8.0 K/UL    ABS. LYMPHOCYTES 2.1 0.8 - 3.5 K/UL    ABS. MONOCYTES 1.2 (H) 0.0 - 1.0 K/UL    ABS. EOSINOPHILS 0.5 (H) 0.0 - 0.4 K/UL    ABS. BASOPHILS 0.1 0.0 - 0.1 K/UL    ABS. IMM.  GRANS. 0.0 0.00 - 0.04 K/UL    DF AUTOMATED     LIPID PANEL   Result Value Ref Range    LIPID PROFILE          Cholesterol, total 145 <200 MG/DL    Triglyceride 114 <150 MG/DL    HDL Cholesterol 50 MG/DL    LDL, calculated 72.2 0 - 100 MG/DL    VLDL, calculated 22.8 MG/DL    CHOL/HDL Ratio 2.9 0 - 5.0     HEMOGLOBIN A1C WITH EAG   Result Value Ref Range    Hemoglobin A1c 5.3 4.2 - 6.3 %    Est. average glucose 105 mg/dL   POC INR   Result Value Ref Range    INR (POC) 1.0 <1.2     GLUCOSE, POC   Result Value Ref Range    Glucose (POC) 127 (H) 65 - 100 mg/dL    Performed by Lilly Azevedo    EKG, 12 LEAD, INITIAL   Result Value Ref Range    Ventricular Rate 101 BPM    Atrial Rate 101 BPM    P-R Interval 132 ms    QRS Duration 78 ms    Q-T Interval 354 ms    QTC Calculation (Bezet) 459 ms    Calculated P Axis 67 degrees    Calculated R Axis 68 degrees    Calculated T Axis 63 degrees    Diagnosis       Sinus tachycardia  Cannot rule out Inferior infarct , age undetermined  Borderline ECG  Confirmed by Jhonny Saeed MD., Chapis ([id number]) on 1/31/2019 5:06:31 PM     DUPLEX CAROTID BILATERAL   Result Value Ref Range    Right vertebral sys 29.6 cm/s    RIGHT VERTEBRAL ARTERY D 10.22 cm/s    Right subclavian sys 98.4 cm/s    RIGHT SUBCLAVIAN ARTERY D 11.63 cm/s    Right cca dist sys 62.8 cm/s    Right CCA dist campbell 15.6 cm/s    Right CCA prox sys 88.4 cm/s    Right CCA prox campbell 21.5 cm/s    Right eca sys 102.2 cm/s    RIGHT EXTERNAL CAROTID ARTERY D 15.55 cm/s    Right ICA dist sys 54.2 cm/s    Right ICA dist campbell 21.9 cm/s    Right ICA mid sys 67.2 cm/s    Right ICA mid campbell 16.7 cm/s    Right ICA prox sys 56.8 cm/s    Right ICA prox campbell 21.9 cm/s    Right ICA/CCA sys 1.1     Left vertebral sys 32.9 cm/s    LEFT VERTEBRAL ARTERY D 13.08 cm/s    Left subclavian sys 132.2 cm/s    LEFT SUBCLAVIAN ARTERY D 16.03 cm/s    Left CCA dist sys 61.2 cm/s    Left CCA dist campbell 19.2 cm/s    Left CCA prox sys 90.4 cm/s    Left CCA prox campbell 25.3 cm/s    Left ECA sys 80.6 cm/s    LEFT EXTERNAL CAROTID ARTERY D 12.76 cm/s    Left ICA dist sys 63.6 cm/s    Left ICA dist campbell 25.2 cm/s    Left ICA mid sys 64.7 cm/s    Left ICA mid campbell 24.1 cm/s    Left ICA prox sys 49.4 cm/s    Left ICA prox campbell 16.4 cm/s    Left ICA/CCA sys 1.06    ECHO ADULT COMPLETE   Result Value Ref Range    LA Volume 26.69 18 - 58 mL    Ao Root D 3.91 cm    Aortic Valve Systolic Peak Velocity 760.56 cm/s    Aortic Valve Area by Continuity of Peak Velocity 1.9 cm2    AoV PG 8.4 mmHg    LVIDd 4.24 4.2 - 5.9 cm    LVPWd 0.94 0.6 - 1.0 cm    LVIDs 2.71 cm    IVSd 0.84 0.6 - 1.0 cm    LVOT d 2.07 cm    LVOT Peak Velocity 81.23 cm/s    LVOT Peak Gradient 2.6 mmHg    MV A Mihir 56.54 cm/s    MV E Mihir 0.47 cm/s    MV E/A 0.01     Left Atrium to Aortic Root Ratio 0.62     RVIDd 2.17 cm    LA Vol 4C 20.70 18 - 58 mL    LA Vol 2C 27.39 18 - 58 mL    LV Mass .3 88 - 224 g    LV Mass AL Index 76.2 49 - 115 g/m2    RVSP 25.3 mmHg    Est. RA Pressure 3.0 mmHg    Mitral Regurgitant Peak Velocity 241.63 cm/s    Mitral Valve E Wave Deceleration Time 183.4 ms    Left Atrium Major Axis 2.42 cm    Triscuspid Valve Regurgitation Peak Gradient 22.3 mmHg    Pulmonic Valve Max Velocity 57.45 cm/s    TR Max Velocity 235.85 cm/s    LA Vol Index 15.26 16 - 28 ml/m2    PASP 25.3 mmHg    LA Vol Index 15.66 16 - 28 ml/m2    LA Vol Index 11.83 16 - 28 ml/m2    MR Peak Gradient 23.4 mmHg    PV peak gradient 1.3 mmHg       Orders Placed This Encounter    clopidogrel (PLAVIX) 75 mg tab     Sig: Take 1 Tab by mouth daily. Dispense:  30 Tab     Refill:  5    atorvastatin (LIPITOR) 10 mg tablet     Sig: Take 1 Tab by mouth daily. Dispense:  30 Tab     Refill:  5    aspirin 81 mg chewable tablet     Sig: Take 1 Tab by mouth daily. Dispense:  30 Tab     Refill:  5       No diagnosis found. Follow-up Disposition: Not on File      Post stroke. We discussed this in full and he has no questions. Keep LDL below 70. Lipitor 10 mg for LDL. Keep Asprin 81 and Plavix 75. Understands s/s of a stroke and when to call 911. We discussed this in full and he has no questions. No residual issues with functioning. He is to resume full functioning. Keep everything the same. Doing well. Understands modifiable risk factors and has no questions about this.        This note will not be viewable in [employer]

## 2019-03-14 ENCOUNTER — APPOINTMENT (OUTPATIENT)
Dept: CT IMAGING | Age: 49
End: 2019-03-14
Attending: EMERGENCY MEDICINE
Payer: COMMERCIAL

## 2019-03-14 ENCOUNTER — HOSPITAL ENCOUNTER (EMERGENCY)
Age: 49
Discharge: HOME | End: 2019-03-14
Attending: EMERGENCY MEDICINE
Payer: COMMERCIAL

## 2019-03-14 VITALS
SYSTOLIC BLOOD PRESSURE: 141 MMHG | TEMPERATURE: 97.4 F | DIASTOLIC BLOOD PRESSURE: 82 MMHG | OXYGEN SATURATION: 96 % | RESPIRATION RATE: 24 BRPM | HEART RATE: 115 BPM

## 2019-03-14 DIAGNOSIS — S01.21XA LACERATION OF NOSE, INITIAL ENCOUNTER: ICD-10-CM
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
## 2019-03-14 DIAGNOSIS — S09.90XA TRAUMATIC INJURY OF HEAD, INITIAL ENCOUNTER: ICD-10-CM

## 2019-03-14 DIAGNOSIS — S02.2XXB OPEN FRACTURE OF NASAL BONE, INITIAL ENCOUNTER: ICD-10-CM

## 2019-03-14 LAB
COMMENT, HOLDF: NORMAL
ERYTHROCYTE [DISTWIDTH] IN BLOOD BY AUTOMATED COUNT: 13.3 % (ref 11.5–14.5)
HCT VFR BLD AUTO: 35 % (ref 36.6–50.3)
HGB BLD-MCNC: 11.9 G/DL (ref 12.1–17)
INR PPP: 1 (ref 0.9–1.1)
MCH RBC QN AUTO: 32.6 PG (ref 26–34)
MCHC RBC AUTO-ENTMCNC: 34 G/DL (ref 30–36.5)
MCV RBC AUTO: 95.9 FL (ref 80–99)
PLATELET # BLD AUTO: 306 K/UL (ref 150–400)
PMV BLD AUTO: 8.7 FL (ref 8.9–12.9)
PROTHROMBIN TIME: 9.6 SEC (ref 9–11.1)
RBC # BLD AUTO: 3.65 M/UL (ref 4.1–5.7)
SAMPLES BEING HELD,HOLD: NORMAL
WBC # BLD AUTO: 10.6 K/UL (ref 4.1–11.1)

## 2019-03-14 PROCEDURE — 77030018836 HC SOL IRR NACL ICUM -A

## 2019-03-14 PROCEDURE — 90715 TDAP VACCINE 7 YRS/> IM: CPT | Performed by: EMERGENCY MEDICINE

## 2019-03-14 PROCEDURE — 85610 PROTHROMBIN TIME: CPT

## 2019-03-14 PROCEDURE — 85027 COMPLETE CBC AUTOMATED: CPT

## 2019-03-14 PROCEDURE — 99284 EMERGENCY DEPT VISIT MOD MDM: CPT

## 2019-03-14 PROCEDURE — 70486 CT MAXILLOFACIAL W/O DYE: CPT

## 2019-03-14 PROCEDURE — 75810000293 HC SIMP/SUPERF WND  RPR

## 2019-03-14 PROCEDURE — 77030002916 HC SUT ETHLN J&J -A

## 2019-03-14 PROCEDURE — 36415 COLL VENOUS BLD VENIPUNCTURE: CPT

## 2019-03-14 PROCEDURE — 90471 IMMUNIZATION ADMIN: CPT

## 2019-03-14 PROCEDURE — 96360 HYDRATION IV INFUSION INIT: CPT

## 2019-03-14 PROCEDURE — 74011250636 HC RX REV CODE- 250/636: Performed by: EMERGENCY MEDICINE

## 2019-03-14 PROCEDURE — 70450 CT HEAD/BRAIN W/O DYE: CPT

## 2019-03-14 RX ORDER — CEPHALEXIN 500 MG/1
500 CAPSULE ORAL 2 TIMES DAILY
Qty: 14 CAP | Refills: 0 | Status: SHIPPED | OUTPATIENT
Start: 2019-03-14 | End: 2019-03-21

## 2019-03-14 RX ORDER — LIDOCAINE HYDROCHLORIDE 10 MG/ML
5 INJECTION, SOLUTION EPIDURAL; INFILTRATION; INTRACAUDAL; PERINEURAL ONCE
Status: COMPLETED | OUTPATIENT
Start: 2019-03-14 | End: 2019-03-14

## 2019-03-14 RX ADMIN — LIDOCAINE HYDROCHLORIDE 5 ML: 10 INJECTION, SOLUTION EPIDURAL; INFILTRATION; INTRACAUDAL; PERINEURAL at 20:06

## 2019-03-14 RX ADMIN — SODIUM CHLORIDE 1000 ML: 900 INJECTION, SOLUTION INTRAVENOUS at 19:57

## 2019-03-14 RX ADMIN — TETANUS TOXOID, REDUCED DIPHTHERIA TOXOID AND ACELLULAR PERTUSSIS VACCINE, ADSORBED 0.5 ML: 5; 2.5; 8; 8; 2.5 SUSPENSION INTRAMUSCULAR at 20:06
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
## 2019-03-15 NOTE — DISCHARGE INSTRUCTIONS
Patient Education        Facial Fracture: Care Instructions  Your Care Instructions  You have broken (fractured) one or more bones in your face. Swelling and bruising from the injury are likely to get worse over the first couple of days. After that, the swelling should steadily improve until it is gone. If you have bruises on your face, they may change as they heal. The skin may turn from black and blue to green to yellow or brown before it returns to its normal color. It may take several weeks for your injury to heal.  It is very important that you get follow-up care as directed so that the injury heals properly and does not lead to problems. The kind of care and treatment you will need depends on the specific type of break (or breaks) you have. You heal best when you take good care of yourself. Eat a variety of healthy foods, and don't smoke. Follow-up care is a key part of your treatment and safety. Be sure to make and go to all appointments, and call your doctor if you are having problems. It's also a good idea to know your test results and keep a list of the medicines you take. How can you care for yourself at home? · Put ice or a cold pack on your injury for 10 to 20 minutes at a time. Try to do this every 1 to 2 hours for the next 3 days (when you are awake) or until the swelling goes down. Put a thin cloth between the ice pack and your skin. · Go to all follow-up appointments with your doctor. Your doctor will determine whether you need further treatment, including surgery. · Take your medicines exactly as prescribed. Call your doctor if you think you are having a problem with your medicine. You will get more details on the specific medicines your doctor prescribes. · If your doctor prescribed antibiotics, take them exactly as directed. Do not stop taking them just because you feel better. You need to take the full course of antibiotics. · Be safe with medicines.  Read and follow all instructions on the label. ? If the doctor gave you a prescription medicine for pain, take it as prescribed. ? If you are not taking a prescription pain medicine, ask your doctor if you can take an over-the-counter medicine. · Keep your head elevated when you sleep. · Eat soft food to decrease jaw pain. · Do not blow your nose. Dab it with a tissue if you need to. When should you call for help? Call 911 anytime you think you may need emergency care. For example, call if:    · You have a seizure.     · You passed out (lost consciousness).     · You have tingling, weakness, or numbness on one side of your body.    Call your doctor now or seek immediate medical care if:    · You have a severe headache.     · You develop double vision.     · You have a fever and stiff neck.     · Clear, watery fluid drains from your nose.     · You feel dizzy or lightheaded.     · You have new eye pain or changes in your vision, such as blurring.     · You have new ear pain, ringing in your ears, or trouble hearing.     · You are confused, irritable, or not acting normally.     · You have a hard time standing, walking, or talking.     · You have new mouth or tooth pain, or you have trouble chewing.     · You have increasing pain even after you have taken your pain medicine.    Watch closely for changes in your health, and be sure to contact your doctor if:    · You develop a cough, cold, or sinus infection.     · The symptoms from your injury are not steadily improving. Where can you learn more? Go to http://yohannes-tye.info/. Enter [id number] in the search box to learn more about \"Facial Fracture: Care Instructions. \"  Current as of: Amanda 3, 2018  Content Version: 11.9  © 1192-8173 [employer]. Care instructions adapted under license by [employer] (which disclaims liability or warranty for this information).  If you have questions about a medical condition or this instruction, always ask your healthcare professional. [address] any warranty or liability for your use of this information. Patient Education        Cuts: Care Instructions  Your Care Instructions  A cut can happen anywhere on your body. Stitches, staples, skin adhesives, or pieces of tape called Steri-Strips are sometimes used to keep the edges of a cut together and help it heal. Steri-Strips can be used by themselves or with stitches or staples. Sometimes cuts are left open. If the cut went deep and through the skin, the doctor may have closed the cut in two layers. A deeper layer of stitches brings the deep part of the cut together. These stitches will dissolve and don't need to be removed. The upper layer closure, which could be stitches, staples, Steri-Strips, or adhesive, is what you see on the cut. A cut is often covered by a bandage. The doctor has checked you carefully, but problems can develop later. If you notice any problems or new symptoms, get medical treatment right away. Follow-up care is a key part of your treatment and safety. Be sure to make and go to all appointments, and call your doctor if you are having problems. It's also a good idea to know your test results and keep a list of the medicines you take. How can you care for yourself at home? If a cut is open or closed  · Prop up the sore area on a pillow anytime you sit or lie down during the next 3 days. Try to keep it above the level of your heart. This will help reduce swelling. · Keep the cut dry for the first 24 to 48 hours. After this, you can shower if your doctor okays it. Pat the cut dry. · Don't soak the cut, such as in a bathtub. Your doctor will tell you when it's safe to get the cut wet. · After the first 24 to 48 hours, clean the cut with soap and water 2 times a day unless your doctor gives you different instructions. ? Don't use hydrogen peroxide or alcohol, which can slow healing.   ? You may cover the cut with a thin layer of petroleum jelly and a nonstick bandage. ? If the doctor put a bandage over the cut, put on a new bandage after cleaning the cut or if the bandage gets wet or dirty. · Avoid any activity that could cause your cut to reopen. · Be safe with medicines. Read and follow all instructions on the label. ? If the doctor gave you a prescription medicine for pain, take it as prescribed. ? If you are not taking a prescription pain medicine, ask your doctor if you can take an over-the-counter medicine. If the cut is closed with stitches, staples, or Steri-Strips  · Follow the above instructions for open or closed cuts. · Do not remove the stitches or staples on your own. Your doctor will tell you when to come back to have the stitches or staples removed. · Leave Steri-Strips on until they fall off. If the cut is closed with a skin adhesive  · Follow the above instructions for open or closed cuts. · Leave the skin adhesive on your skin until it falls off on its own. This may take 5 to 10 days. · Do not scratch, rub, or pick at the adhesive. · Do not put the sticky part of a bandage directly on the adhesive. · Do not put any kind of ointment, cream, or lotion over the area. This can make the adhesive fall off too soon. Do not use hydrogen peroxide or alcohol, which can slow healing. When should you call for help? Call your doctor now or seek immediate medical care if:    · You have new pain, or your pain gets worse.     · The skin near the cut is cold or pale or changes color.     · You have tingling, weakness, or numbness near the cut.     · The cut starts to bleed, and blood soaks through the bandage. Oozing small amounts of blood is normal.     · You have trouble moving the area near the cut.     · You have symptoms of infection, such as:  ? Increased pain, swelling, warmth, or redness around the cut.  ?  Red streaks leading from the cut.  ? Pus draining from the cut.  ? A fever.    Watch closely for changes in your health, and be sure to contact your doctor if:    · The cut reopens.     · You do not get better as expected. Where can you learn more? Go to http://yohannes-tye.info/. Enter M735 in the search box to learn more about \"Cuts: Care Instructions. \"  Current as of: September 23, 2018  Content Version: 11.9  © 9958-9108 [facility]. Care instructions adapted under license by [facility] (which disclaims liability or warranty for this information). If you have questions about a medical condition or this instruction, always ask your healthcare professional. [place] [address] any warranty or liability for your use of this information. Patient Education        Learning About a Closed Head Injury  What is a closed head injury? A closed head injury happens when your head gets hit hard. The strong force of the blow causes your brain to shake in your skull. This movement can cause the brain to bruise, swell, or tear. Sometimes nerves or blood vessels also get damaged. This can cause bleeding in or around the brain. A concussion is a type of closed head injury. What are the symptoms? If you have a mild concussion, you may have a mild headache or feel \"not quite right. \" These symptoms are common. They usually go away over a few days to 4 weeks. But sometimes after a concussion, you feel like you can't function as well as before the injury. And you have new symptoms. This is called postconcussive syndrome. You may:  · Find it harder to solve problems, think, concentrate, or remember. · Have headaches. · Have changes in your sleep patterns, such as not being able to sleep or sleeping all the time. · Have changes in your personality. · Not be interested in your usual activities. · Feel angry or anxious without a clear reason. · Lose your sense of taste or smell. · Be dizzy, lightheaded, or unsteady. It may be hard to stand or walk.   How is a closed head injury treated? Any person who may have a concussion needs to see a doctor. Some people have to stay in the hospital to be watched. Others can go home safely. If you go home, follow your doctor's instructions. He or she will tell you if you need someone to watch you closely for the next 24 hours or longer. Rest is the best treatment. Get plenty of sleep at night. And try to rest during the day. · Avoid activities that are physically or mentally demanding. These include housework, exercise, and schoolwork. And don't play video games, send text messages, or use the computer. You may need to change your school or work schedule to be able to avoid these activities. · Ask your doctor when it's okay to drive, ride a bike, or operate machinery. · Take an over-the-counter pain medicine, such as acetaminophen (Tylenol), ibuprofen (Advil, Motrin), or naproxen (Aleve). Be safe with medicines. Read and follow all instructions on the label. · Check with your doctor before you use any other medicines for pain. · Do not drink alcohol or use illegal drugs. They can slow recovery. They can also increase your risk of getting a second head injury. Follow-up care is a key part of your treatment and safety. Be sure to make and go to all appointments, and call your doctor if you are having problems. It's also a good idea to know your test results and keep a list of the medicines you take. Where can you learn more? Go to http://yohannes-tye.info/. Enter E235 in the search box to learn more about \"Learning About a Closed Head Injury. \"  Current as of: Amanda 3, 2018  Content Version: 11.9  © 5764-0268 [employer]. Care instructions adapted under license by [employer] (which disclaims liability or warranty for this information).  If you have questions about a medical condition or this instruction, always ask your healthcare professional. [place] [employer] any warranty or liability for your use of this information. Patient Education        Broken Nose: Care Instructions  Your Care Instructions    A broken nose is a break, or fracture, of the bone or cartilage. Most broken noses need only home care and a follow-up visit with a doctor. The swelling should go down in a few days. Bruises around your eyes and nose should go away in 2 to 3 weeks. You heal best when you take good care of yourself. Eat a variety of healthy foods, and don't smoke. Follow-up care is a key part of your treatment and safety. Be sure to make and go to all appointments, and call your doctor if you are having problems. It's also a good idea to know your test results and keep a list of the medicines you take. How can you care for yourself at home? · If you have a nasal splint or packing, leave it in place until a doctor removes it. · If your doctor prescribed antibiotics, take them as directed. Do not stop taking them just because you feel better. You need to take the full course of antibiotics. · Take decongestants as directed to help you breathe after the splint or packing is removed. Your doctor may give you a prescription or suggest over-the-counter medicine. · Be safe with medicines. Take pain medicines exactly as directed. ? If the doctor gave you a prescription medicine for pain, take it as prescribed. ? If you are not taking a prescription pain medicine, ask your doctor if you can take an over-the-counter medicine. · Put ice or a cold pack on your nose for 10 to 20 minutes at a time. Try to do this every 1 to 2 hours for the first 3 days (when you are awake) or until the swelling goes down. Put a thin cloth between the ice pack and your skin. · Sleep with your head slightly raised until the swelling goes down. Prop up your head and shoulders on pillows. · Do not play contact sports for 6 weeks. When should you call for help?   Call 911 anytime you think you may need emergency care. For example, call if:    · You have trouble breathing.     · You passed out (lost consciousness).    Call your doctor now or seek immediate medical care if:    · You have signs of infection, such as:  ? Increased pain, swelling, warmth, or redness. ? Red streaks leading from the area. ? Pus draining from the area. ? A fever.     · You have clear fluid draining from your nose.     · You have vision changes.     · Your nose is bleeding.     · You have new or worse pain.    Watch closely for changes in your health, and be sure to contact your doctor if:    · You do not get better as expected. Where can you learn more? Go to http://yohannes-tye.info/. Enter Y345 in the search box to learn more about \"Broken Nose: Care Instructions. \"  Current as of: September 20, 2018  Content Version: 11.9  © 4492-7022 [employer]. Care instructions adapted under license by [employer] (which disclaims liability or warranty for this information). If you have questions about a medical condition or this instruction, always ask your healthcare professional. [address] any warranty or liability for your use of this information.